# Patient Record
Sex: FEMALE | Race: WHITE | Employment: UNEMPLOYED | URBAN - NONMETROPOLITAN AREA
[De-identification: names, ages, dates, MRNs, and addresses within clinical notes are randomized per-mention and may not be internally consistent; named-entity substitution may affect disease eponyms.]

---

## 2019-06-15 ENCOUNTER — HOSPITAL ENCOUNTER (EMERGENCY)
Age: 54
Discharge: HOME | End: 2019-06-15
Payer: OTHER GOVERNMENT

## 2019-06-15 VITALS
OXYGEN SATURATION: 96 % | HEART RATE: 90 BPM | DIASTOLIC BLOOD PRESSURE: 76 MMHG | RESPIRATION RATE: 16 BRPM | TEMPERATURE: 97.9 F | BODY MASS INDEX: 21.46 KG/M2 | SYSTOLIC BLOOD PRESSURE: 159 MMHG | WEIGHT: 137 LBS

## 2019-06-15 DIAGNOSIS — N30.00 ACUTE CYSTITIS WITHOUT HEMATURIA: Primary | ICD-10-CM

## 2019-06-15 LAB
BILIRUBIN URINE: NEGATIVE
BLOOD, URINE: NEGATIVE
CHARACTER, URINE: CLEAR
COLOR: YELLOW
GLUCOSE, URINE: NEGATIVE MG/DL
KETONES, URINE: ABNORMAL
LEUKOCYTES, UA: ABNORMAL
NITRATE, UA: NEGATIVE
PH UA: 5.5 (ref 5–9)
PROTEIN UA: 30 MG/DL
REFLEX TO URINE C & S: ABNORMAL
SPECIFIC GRAVITY UA: 1.02 (ref 1–1.03)
UROBILINOGEN, URINE: 0.2 EU/DL (ref 0–1)

## 2019-06-15 PROCEDURE — 99202 OFFICE O/P NEW SF 15 MIN: CPT | Performed by: NURSE PRACTITIONER

## 2019-06-15 PROCEDURE — 87086 URINE CULTURE/COLONY COUNT: CPT

## 2019-06-15 PROCEDURE — 99213 OFFICE O/P EST LOW 20 MIN: CPT

## 2019-06-15 PROCEDURE — 81003 URINALYSIS AUTO W/O SCOPE: CPT

## 2019-06-15 RX ORDER — SULFAMETHOXAZOLE AND TRIMETHOPRIM 800; 160 MG/1; MG/1
1 TABLET ORAL 2 TIMES DAILY
Qty: 14 TABLET | Refills: 0 | Status: SHIPPED | OUTPATIENT
Start: 2019-06-15 | End: 2019-06-22

## 2019-06-15 RX ORDER — METOPROLOL SUCCINATE 25 MG/1
25 TABLET, EXTENDED RELEASE ORAL DAILY
COMMUNITY
End: 2020-11-16 | Stop reason: HOSPADM

## 2019-06-15 ASSESSMENT — ENCOUNTER SYMPTOMS
NAUSEA: 0
VOMITING: 0
BACK PAIN: 0
ABDOMINAL PAIN: 0

## 2019-06-15 ASSESSMENT — PAIN DESCRIPTION - LOCATION: LOCATION: VAGINA

## 2019-06-15 ASSESSMENT — PAIN DESCRIPTION - PAIN TYPE: TYPE: ACUTE PAIN

## 2019-06-15 ASSESSMENT — PAIN DESCRIPTION - DESCRIPTORS: DESCRIPTORS: BURNING

## 2019-06-15 ASSESSMENT — PAIN DESCRIPTION - FREQUENCY: FREQUENCY: CONTINUOUS

## 2019-06-15 ASSESSMENT — PAIN SCALES - GENERAL: PAINLEVEL_OUTOF10: 10

## 2019-06-15 NOTE — ED PROVIDER NOTES
volume, flank pain, hematuria, vaginal bleeding and vaginal discharge. Musculoskeletal: Negative for back pain. PAST MEDICAL HISTORY         Diagnosis Date    Migraines     RA (rheumatoid arthritis) ([address]     Rheumatoid arthritis(714.0)        SURGICALHISTORY     Patient  has a past surgical history that includes  section; Tubal ligation; Endometrial ablation; Endoscopy, colon, diagnostic (); Hysterectomy; and transesophageal echocardiogram.    CURRENT MEDICATIONS       Previous Medications    BUTALBITAL-ACETAMINOPHEN-CAFFEINE (FIORICET, ESGIC) PER TABLET    Take 1 tablet by mouth every 4 hours as needed. METHOTREXATE, ANTI-RHEUMATIC, PO    Take by mouth    METOPROLOL SUCCINATE (TOPROL XL) 25 MG EXTENDED RELEASE TABLET    Take 25 mg by mouth daily       ALLERGIES     Patient is has No Known Allergies. Patients   There is no immunization history on file for this patient. FAMILY HISTORY     Patient's family history includes Cancer in her father and mother; Stroke in her mother. SOCIAL HISTORY     Patient  reports that she has been smoking cigarettes. She has never used smokeless tobacco. She reports that she drinks alcohol. She reports that she does not use drugs. PHYSICAL EXAM     ED TRIAGE VITALS  BP: (!) 159/76, Temp: 97.9 °F (36.6 °C), Pulse: 90, Resp: 16, SpO2: 96 %,Estimated body mass index is 21.46 kg/m² as calculated from the following:    Height as of 17: 5' 7\" (1.702 m). Weight as of this encounter: 137 lb (62.1 kg). ,No LMP recorded. Patient has had a hysterectomy. Physical Exam   Constitutional: She is oriented to person, place, and time. Vital signs are normal. She appears well-developed and well-nourished. She is cooperative. Non-toxic appearance. She does not have a sickly appearance. She does not appear ill. No distress. HENT:   Head: Normocephalic.    Right Ear: External ear normal.   Left Ear: External ear normal.   Nose: Nose normal.   Neck: Normal range of motion. Cardiovascular: Normal rate. Pulmonary/Chest: Effort normal.   Abdominal: Soft. Bowel sounds are normal. There is tenderness in the suprapubic area. There is no rigidity, no CVA tenderness, no tenderness at McBurney's point and negative Ingram's sign. Neurological: She is alert and oriented to person, place, and time. Skin: Skin is warm and dry. Psychiatric: She has a normal mood and affect. Her behavior is normal.   Nursing note and vitals reviewed. DIAGNOSTIC RESULTS     Labs:  Results for orders placed or performed during the hospital encounter of 06/15/19   UA without Microscopic Reflex C&S   Result Value Ref Range    Glucose, Urine Negative NEGATIVE mg/dl    Bilirubin Urine Negative NEGATIVE    Ketones, Urine Trace (A) NEGATIVE    Specific Gravity, UA 1.025 1.002 - 1.03    Blood, Urine Negative NEGATIVE    pH, UA 5.50 5.0 - 9.0    Protein, UA 30 (A) NEGATIVE mg/dl    Urobilinogen, Urine 0.20 0.0 - 1.0 eu/dl    Nitrate, UA Negative NEGATIVE    LEUKOCYTES, UA Moderate (A) NEGATIVE    Color, UA Yellow STRAW-YELL    Character, Urine Clear CLEAR-SL C    REFLEX TO URINE C & S INDICATED        IMAGING:    No orders to display         EKG:      URGENT CARE COURSE:     Vitals:    06/15/19 1524   BP: (!) 159/76   Pulse: 90   Resp: 16   Temp: 97.9 °F (36.6 °C)   TempSrc: Oral   SpO2: 96%   Weight: 137 lb (62.1 kg)       Medications - No data to display         PROCEDURES:  None    FINAL IMPRESSION      1. Acute cystitis without hematuria          DISPOSITION/ PLAN        Patient or Patient designated representative was advised to drink plenty of water or fluids and take medication as prescribed. The patient or Patient designated representative is advised to monitor for any changes in pain, development of high fever, chills, persistent vomiting, development of increasing back or flank pain or increase in hematuria the patient is advised to go to the emergency department for reevaluation

## 2019-06-16 LAB
ORGANISM: ABNORMAL
URINE CULTURE REFLEX: ABNORMAL

## 2019-08-20 ENCOUNTER — HOSPITAL ENCOUNTER (EMERGENCY)
Age: 54
Discharge: HOME | End: 2019-08-20
Attending: EMERGENCY MEDICINE
Payer: OTHER GOVERNMENT

## 2019-08-20 ENCOUNTER — APPOINTMENT (OUTPATIENT)
Dept: GENERAL RADIOLOGY | Age: 54
End: 2019-08-20
Payer: OTHER GOVERNMENT

## 2019-08-20 VITALS
RESPIRATION RATE: 15 BRPM | SYSTOLIC BLOOD PRESSURE: 128 MMHG | HEART RATE: 67 BPM | OXYGEN SATURATION: 100 % | BODY MASS INDEX: 21.97 KG/M2 | WEIGHT: 140 LBS | DIASTOLIC BLOOD PRESSURE: 90 MMHG | HEIGHT: 67 IN | TEMPERATURE: 97.7 F

## 2019-08-20 DIAGNOSIS — R09.1 PLEURISY: ICD-10-CM

## 2019-08-20 DIAGNOSIS — R07.89 ATYPICAL CHEST PAIN: Primary | ICD-10-CM

## 2019-08-20 LAB
ALBUMIN SERPL-MCNC: 4.3 G/DL (ref 3.5–5.1)
ALP BLD-CCNC: 76 U/L (ref 38–126)
ALT SERPL-CCNC: 11 U/L (ref 11–66)
ANION GAP SERPL CALCULATED.3IONS-SCNC: 11 MEQ/L (ref 8–16)
AST SERPL-CCNC: 16 U/L (ref 5–40)
BACTERIA: ABNORMAL /HPF
BASOPHILS # BLD: 0.8 %
BASOPHILS ABSOLUTE: 0.1 THOU/MM3 (ref 0–0.1)
BILIRUB SERPL-MCNC: 0.4 MG/DL (ref 0.3–1.2)
BILIRUBIN URINE: NEGATIVE
BLOOD, URINE: NEGATIVE
BUN BLDV-MCNC: 17 MG/DL (ref 7–22)
CALCIUM SERPL-MCNC: 9.5 MG/DL (ref 8.5–10.5)
CASTS 2: ABNORMAL /LPF
CASTS UA: ABNORMAL /LPF
CHARACTER, URINE: ABNORMAL
CHLORIDE BLD-SCNC: 100 MEQ/L (ref 98–111)
CO2: 28 MEQ/L (ref 23–33)
COLOR: YELLOW
CREAT SERPL-MCNC: 0.8 MG/DL (ref 0.4–1.2)
CRYSTALS, UA: ABNORMAL
D-DIMER QUANTITATIVE: 294 NG/ML FEU (ref 0–500)
EKG ATRIAL RATE: 65 BPM
EKG P AXIS: 54 DEGREES
EKG P-R INTERVAL: 166 MS
EKG Q-T INTERVAL: 404 MS
EKG QRS DURATION: 100 MS
EKG QTC CALCULATION (BAZETT): 420 MS
EKG R AXIS: 65 DEGREES
EKG T AXIS: 70 DEGREES
EKG VENTRICULAR RATE: 65 BPM
EOSINOPHIL # BLD: 0.4 %
EOSINOPHILS ABSOLUTE: 0 THOU/MM3 (ref 0–0.4)
EPITHELIAL CELLS, UA: ABNORMAL /HPF
ERYTHROCYTE [DISTWIDTH] IN BLOOD BY AUTOMATED COUNT: 11.9 % (ref 11.5–14.5)
ERYTHROCYTE [DISTWIDTH] IN BLOOD BY AUTOMATED COUNT: 42 FL (ref 35–45)
GFR SERPL CREATININE-BSD FRML MDRD: 75 ML/MIN/1.73M2
GLUCOSE BLD-MCNC: 92 MG/DL (ref 70–108)
GLUCOSE URINE: NEGATIVE MG/DL
HCT VFR BLD CALC: 39.3 % (ref 37–47)
HEMOGLOBIN: 12.7 GM/DL (ref 12–16)
IMMATURE GRANS (ABS): 0.02 THOU/MM3 (ref 0–0.07)
IMMATURE GRANULOCYTES: 0 %
KETONES, URINE: NEGATIVE
LEUKOCYTE ESTERASE, URINE: ABNORMAL
LIPASE: 24.8 U/L (ref 5.6–51.3)
LYMPHOCYTES # BLD: 18.8 %
LYMPHOCYTES ABSOLUTE: 1.4 THOU/MM3 (ref 1–4.8)
MCH RBC QN AUTO: 31.4 PG (ref 26–33)
MCHC RBC AUTO-ENTMCNC: 32.3 GM/DL (ref 32.2–35.5)
MCV RBC AUTO: 97.3 FL (ref 81–99)
MISCELLANEOUS 2: ABNORMAL
MONOCYTES # BLD: 9.9 %
MONOCYTES ABSOLUTE: 0.8 THOU/MM3 (ref 0.4–1.3)
MUCUS: ABNORMAL
NITRITE, URINE: NEGATIVE
NUCLEATED RED BLOOD CELLS: 0 /100 WBC
OSMOLALITY CALCULATION: 278.7 MOSMOL/KG (ref 275–300)
PH UA: 5.5 (ref 5–9)
PLATELET # BLD: 350 THOU/MM3 (ref 130–400)
PMV BLD AUTO: 9.8 FL (ref 9.4–12.4)
POTASSIUM SERPL-SCNC: 4.5 MEQ/L (ref 3.5–5.2)
PROTEIN UA: NEGATIVE
RBC # BLD: 4.04 MILL/MM3 (ref 4.2–5.4)
RBC URINE: ABNORMAL /HPF
RENAL EPITHELIAL, UA: ABNORMAL
SEG NEUTROPHILS: 69.8 %
SEGMENTED NEUTROPHILS ABSOLUTE COUNT: 5.3 THOU/MM3 (ref 1.8–7.7)
SODIUM BLD-SCNC: 139 MEQ/L (ref 135–145)
SPECIFIC GRAVITY, URINE: 1.03 (ref 1–1.03)
TOTAL PROTEIN: 7.6 G/DL (ref 6.1–8)
TROPONIN T: < 0.01 NG/ML
UROBILINOGEN, URINE: 0.2 EU/DL (ref 0–1)
WBC # BLD: 7.6 THOU/MM3 (ref 4.8–10.8)
WBC UA: ABNORMAL /HPF
YEAST: ABNORMAL

## 2019-08-20 PROCEDURE — 36415 COLL VENOUS BLD VENIPUNCTURE: CPT

## 2019-08-20 PROCEDURE — 85025 COMPLETE CBC W/AUTO DIFF WBC: CPT

## 2019-08-20 PROCEDURE — 99285 EMERGENCY DEPT VISIT HI MDM: CPT

## 2019-08-20 PROCEDURE — 93010 ELECTROCARDIOGRAM REPORT: CPT | Performed by: INTERNAL MEDICINE

## 2019-08-20 PROCEDURE — 80053 COMPREHEN METABOLIC PANEL: CPT

## 2019-08-20 PROCEDURE — 6360000002 HC RX W HCPCS: Performed by: EMERGENCY MEDICINE

## 2019-08-20 PROCEDURE — 81001 URINALYSIS AUTO W/SCOPE: CPT

## 2019-08-20 PROCEDURE — 85379 FIBRIN DEGRADATION QUANT: CPT

## 2019-08-20 PROCEDURE — 71045 X-RAY EXAM CHEST 1 VIEW: CPT

## 2019-08-20 PROCEDURE — 96374 THER/PROPH/DIAG INJ IV PUSH: CPT

## 2019-08-20 PROCEDURE — 93005 ELECTROCARDIOGRAM TRACING: CPT | Performed by: EMERGENCY MEDICINE

## 2019-08-20 PROCEDURE — 83690 ASSAY OF LIPASE: CPT

## 2019-08-20 PROCEDURE — 87086 URINE CULTURE/COLONY COUNT: CPT

## 2019-08-20 PROCEDURE — 84484 ASSAY OF TROPONIN QUANT: CPT

## 2019-08-20 RX ORDER — NAPROXEN 500 MG/1
500 TABLET ORAL 2 TIMES DAILY PRN
Qty: 30 TABLET | Refills: 0 | Status: SHIPPED | OUTPATIENT
Start: 2019-08-20 | End: 2019-10-04 | Stop reason: ALTCHOICE

## 2019-08-20 RX ORDER — HYDROCODONE BITARTRATE AND ACETAMINOPHEN 5; 325 MG/1; MG/1
.5-1 TABLET ORAL EVERY 4 HOURS PRN
Qty: 6 TABLET | Refills: 0 | Status: SHIPPED | OUTPATIENT
Start: 2019-08-20 | End: 2019-08-22

## 2019-08-20 RX ORDER — KETOROLAC TROMETHAMINE 30 MG/ML
15 INJECTION, SOLUTION INTRAMUSCULAR; INTRAVENOUS ONCE
Status: COMPLETED | OUTPATIENT
Start: 2019-08-20 | End: 2019-08-20

## 2019-08-20 RX ADMIN — KETOROLAC TROMETHAMINE 15 MG: 30 INJECTION, SOLUTION INTRAMUSCULAR at 16:57

## 2019-08-20 ASSESSMENT — ENCOUNTER SYMPTOMS
SORE THROAT: 0
NAUSEA: 0
VOMITING: 0
BACK PAIN: 0
DIARRHEA: 0
SHORTNESS OF BREATH: 0
WHEEZING: 0
EYE DISCHARGE: 0
COUGH: 0
EYE PAIN: 0
CONSTIPATION: 0
ABDOMINAL PAIN: 0
RHINORRHEA: 0

## 2019-08-20 ASSESSMENT — PAIN DESCRIPTION - LOCATION
LOCATION: ABDOMEN

## 2019-08-20 ASSESSMENT — PAIN DESCRIPTION - ORIENTATION
ORIENTATION: LEFT;UPPER

## 2019-08-20 ASSESSMENT — HEART SCORE: ECG: 0

## 2019-08-20 ASSESSMENT — PAIN SCALES - GENERAL
PAINLEVEL_OUTOF10: 2
PAINLEVEL_OUTOF10: 7
PAINLEVEL_OUTOF10: 8

## 2019-08-20 ASSESSMENT — PAIN DESCRIPTION - DESCRIPTORS: DESCRIPTORS: ACHING

## 2019-08-20 ASSESSMENT — PAIN DESCRIPTION - PAIN TYPE
TYPE: ACUTE PAIN
TYPE: ACUTE PAIN

## 2019-08-20 NOTE — ED PROVIDER NOTES
[facility] COMPLAINT       Chief Complaint   Patient presents with    Abdominal Pain     luq       Nurses Notes reviewed and I agree except as noted in the HPI. HISTORY OF PRESENT ILLNESS    Vince Villarreal is a 49 y.o. female who presents to the Emergency Department from home for the evaluation of left sided chest wall pain onset last night. She rates her current pain as a 7/10 in severity and states it is made worse with twisting to the left, moving twisting lifting and palpation on the left side of her chest. Patient states she also has pain with deep inspiration. She describes her pain as a constant sharp pain in character. Patient denies pain with exertion. Patient denies cough, dysuria, hematuria or dark urine. Patient states she has RA but denies ever having Costochondritis. Patient has had an ablation and hysterectomy done on her abdomen. Is not on hormonal replacement therapy. No history of DVT hemoptysis or blood clots. no further complaints at the time of the initial encounter. The HPI was provided by the patient. REVIEW OF SYSTEMS     Review of Systems   Constitutional: Negative for appetite change, chills, fatigue and fever. HENT: Negative for congestion, ear pain, rhinorrhea and sore throat. Eyes: Negative for pain, discharge and visual disturbance. Respiratory: Negative for cough, shortness of breath and wheezing. Cardiovascular: Positive for chest pain (chest wall, left). Negative for palpitations and leg swelling. Gastrointestinal: Negative for abdominal pain, constipation, diarrhea, nausea and vomiting. Genitourinary: Negative for decreased urine volume, difficulty urinating, dysuria, flank pain, frequency, hematuria, urgency and vaginal discharge. Musculoskeletal: Negative for arthralgias, back pain, joint swelling and neck pain. Skin: Negative for pallor and rash.    Neurological: Negative for dizziness, syncope, weakness, light-headedness, numbness and headaches. Hematological: Negative for adenopathy. Psychiatric/Behavioral: Negative for confusion and suicidal ideas. The patient is not nervous/anxious. PAST MEDICAL HISTORY    has a past medical history of Migraines, RA (rheumatoid arthritis) ([address] and Rheumatoid arthritis(714.0). SURGICAL HISTORY      has a past surgical history that includes  section; Tubal ligation; Endometrial ablation; Endoscopy, colon, diagnostic (); Hysterectomy; and transesophageal echocardiogram.    CURRENT MEDICATIONS       Previous Medications    BUTALBITAL-ACETAMINOPHEN-CAFFEINE (FIORICET, ESGIC) PER TABLET    Take 1 tablet by mouth every 4 hours as needed. METHOTREXATE, ANTI-RHEUMATIC, PO    Take by mouth    METOPROLOL SUCCINATE (TOPROL XL) 25 MG EXTENDED RELEASE TABLET    Take 25 mg by mouth daily       ALLERGIES     has No Known Allergies. FAMILY HISTORY     She indicated that her mother is alive. She indicated that her father is . family history includes Cancer in her father and mother; Stroke in her mother. SOCIAL HISTORY      reports that she has been smoking cigarettes. She has never used smokeless tobacco. She reports that she drinks alcohol. She reports that she does not use drugs. PHYSICAL EXAM     INITIAL VITALS:  height is 5' 7\" (1.702 m) and weight is 140 lb (63.5 kg). Her oral temperature is 97.7 °F (36.5 °C). Her blood pressure is 150/74 (abnormal) and her pulse is 66. Her respiration is 16 and oxygen saturation is 98%. Physical Exam   Constitutional: She is oriented to person, place, and time. She appears well-developed and well-nourished. No distress. HENT:   Head: Normocephalic and atraumatic. Eyes: Conjunctivae and EOM are normal.   Neck: Normal range of motion. Neck supple. No thyromegaly present. Cardiovascular: Normal rate, regular rhythm and normal heart sounds. No murmur heard.   Pulmonary/Chest: Effort

## 2019-08-20 NOTE — ED TRIAGE NOTES
Patient presents to room 29 with complaints of abdominal pain. Patient stated pain fluctuates from 4-8 on a 0/10.

## 2019-08-20 NOTE — ED NOTES
Pt medicated per order. VS stbale. Call light within easy reach. Will continue to monitor.      Kal Ayers RN  08/20/19 6423

## 2019-08-21 LAB
ORGANISM: ABNORMAL
URINE CULTURE REFLEX: ABNORMAL

## 2019-10-04 ENCOUNTER — HOSPITAL ENCOUNTER (EMERGENCY)
Age: 54
Discharge: HOME | End: 2019-10-04
Payer: OTHER GOVERNMENT

## 2019-10-04 VITALS
OXYGEN SATURATION: 97 % | WEIGHT: 140.13 LBS | TEMPERATURE: 97 F | HEART RATE: 90 BPM | RESPIRATION RATE: 18 BRPM | HEIGHT: 67 IN | DIASTOLIC BLOOD PRESSURE: 73 MMHG | BODY MASS INDEX: 21.99 KG/M2 | SYSTOLIC BLOOD PRESSURE: 133 MMHG

## 2019-10-04 DIAGNOSIS — N30.01 ACUTE CYSTITIS WITH HEMATURIA: Primary | ICD-10-CM

## 2019-10-04 LAB
BILIRUBIN URINE: NEGATIVE
BLOOD, URINE: ABNORMAL
CHARACTER, URINE: CLEAR
COLOR: ABNORMAL
GLUCOSE BLD-MCNC: 107 MG/DL (ref 70–108)
GLUCOSE, URINE: 100 MG/DL
KETONES, URINE: NEGATIVE
LEUKOCYTES, UA: ABNORMAL
NITRATE, UA: POSITIVE
PH UA: 5 (ref 5–9)
PROTEIN UA: NEGATIVE MG/DL
REFLEX TO URINE C & S: ABNORMAL
SPECIFIC GRAVITY UA: 1.01 (ref 1–1.03)
UROBILINOGEN, URINE: 0.2 EU/DL (ref 0–1)

## 2019-10-04 PROCEDURE — 99213 OFFICE O/P EST LOW 20 MIN: CPT | Performed by: NURSE PRACTITIONER

## 2019-10-04 PROCEDURE — 99213 OFFICE O/P EST LOW 20 MIN: CPT

## 2019-10-04 PROCEDURE — 87186 SC STD MICRODIL/AGAR DIL: CPT

## 2019-10-04 PROCEDURE — 82948 REAGENT STRIP/BLOOD GLUCOSE: CPT

## 2019-10-04 PROCEDURE — 87086 URINE CULTURE/COLONY COUNT: CPT

## 2019-10-04 PROCEDURE — 87077 CULTURE AEROBIC IDENTIFY: CPT

## 2019-10-04 PROCEDURE — 81003 URINALYSIS AUTO W/O SCOPE: CPT

## 2019-10-04 RX ORDER — SULFAMETHOXAZOLE AND TRIMETHOPRIM 800; 160 MG/1; MG/1
1 TABLET ORAL 2 TIMES DAILY
Qty: 10 TABLET | Refills: 0 | Status: SHIPPED | OUTPATIENT
Start: 2019-10-04 | End: 2019-10-09

## 2019-10-04 ASSESSMENT — ENCOUNTER SYMPTOMS
BACK PAIN: 0
SHORTNESS OF BREATH: 0
VOMITING: 0
ABDOMINAL PAIN: 0
NAUSEA: 0

## 2019-10-06 LAB
ORGANISM: ABNORMAL
URINE CULTURE REFLEX: ABNORMAL

## 2020-01-11 ENCOUNTER — HOSPITAL ENCOUNTER (EMERGENCY)
Age: 55
Discharge: HOME | End: 2020-01-11
Payer: OTHER GOVERNMENT

## 2020-01-11 VITALS
TEMPERATURE: 97.8 F | OXYGEN SATURATION: 98 % | HEART RATE: 84 BPM | RESPIRATION RATE: 20 BRPM | BODY MASS INDEX: 21.97 KG/M2 | HEIGHT: 67 IN | DIASTOLIC BLOOD PRESSURE: 80 MMHG | SYSTOLIC BLOOD PRESSURE: 150 MMHG | WEIGHT: 140 LBS

## 2020-01-11 PROCEDURE — 99213 OFFICE O/P EST LOW 20 MIN: CPT | Performed by: NURSE PRACTITIONER

## 2020-01-11 PROCEDURE — 99212 OFFICE O/P EST SF 10 MIN: CPT

## 2020-01-11 RX ORDER — ACYCLOVIR 800 MG/1
800 TABLET ORAL
Qty: 35 TABLET | Refills: 0 | Status: SHIPPED | OUTPATIENT
Start: 2020-01-11 | End: 2020-01-18

## 2020-01-11 RX ORDER — HYDROCODONE BITARTRATE AND ACETAMINOPHEN 5; 325 MG/1; MG/1
1 TABLET ORAL EVERY 6 HOURS PRN
Qty: 12 TABLET | Refills: 0 | Status: SHIPPED | OUTPATIENT
Start: 2020-01-11 | End: 2020-01-14

## 2020-01-11 ASSESSMENT — PAIN DESCRIPTION - PAIN TYPE: TYPE: ACUTE PAIN

## 2020-01-11 ASSESSMENT — PAIN DESCRIPTION - PROGRESSION: CLINICAL_PROGRESSION: GRADUALLY WORSENING

## 2020-01-11 ASSESSMENT — PAIN DESCRIPTION - ORIENTATION: ORIENTATION: LEFT;RIGHT;MID

## 2020-01-11 ASSESSMENT — PAIN DESCRIPTION - DESCRIPTORS: DESCRIPTORS: SHARP

## 2020-01-11 ASSESSMENT — PAIN DESCRIPTION - FREQUENCY: FREQUENCY: CONTINUOUS

## 2020-01-11 ASSESSMENT — PAIN SCALES - GENERAL: PAINLEVEL_OUTOF10: 2

## 2020-01-11 ASSESSMENT — PAIN - FUNCTIONAL ASSESSMENT: PAIN_FUNCTIONAL_ASSESSMENT: PREVENTS OR INTERFERES SOME ACTIVE ACTIVITIES AND ADLS

## 2020-01-11 ASSESSMENT — PAIN DESCRIPTION - LOCATION: LOCATION: BACK

## 2020-01-11 NOTE — ED PROVIDER NOTES
[facility]  Ur[facility] Care Encounter       CHIEF COMPLAINT       Chief Complaint   Patient presents with    Rash     (R) Mid Back       Nurses Notes reviewed and I agree except as noted in the HPI. HISTORY OF PRESENT ILLNESS   Naga Villarreal is a 49 y.o. female who presents to the urgent care center complaining of a rash to the right mid back that started 3 days ago. Patient rates her pain initially 2 on a 10 scale describes it as sharp and continuous. Patient denies any fever or chills. Patient states that it is starting to radiate to the right side of her torso. The history is provided by the patient. No  was used. Rash   Location:  Torso  Quality: blistering, painful and redness    Quality: not burning, not draining and not dry    Pain details:     Quality:  Stinging    Severity:  Mild    Onset quality:  Sudden    Duration:  3 days    Timing:  Constant    Progression:  Unchanged  Severity:  Mild  Onset quality:  Sudden  Duration:  3 days  Timing:  Constant  Progression:  Waxing and waning  Chronicity:  New  Context: not chemical exposure    Relieved by:  Nothing  Worsened by:  Nothing  Ineffective treatments:  None tried  Associated symptoms: no fever, no headaches and no myalgias        REVIEW OF SYSTEMS     Review of Systems   Constitutional: Negative for appetite change, chills and fever. Musculoskeletal: Negative for myalgias. Skin: Positive for rash. Right mid back and torso   Neurological: Negative for headaches. PAST MEDICAL HISTORY         Diagnosis Date    Migraines     [facility] ([facility]heumatoid [facility]) [address] [address]     Rheumatoid arthritis(714.0)        SURGICALHISTORY     Patient  has a past surgical history that includes  section; Tubal ligation; Endometrial ablation; Endoscopy, colon, diagnostic ();  Hysterectomy; and transesophageal echocardiogram.    CURRENT MEDICATIONS       Discharge Medication List as of 2020  3:25 PM for up to 3 days. , Disp-12 tablet, R-0Normal             Discharge Medication List as of 1/11/2020  3:25 PM          Discharge Medication List as of 1/11/2020  3:25 PM          ALY Loera CNP    (Please note that portions of this note were completed with a voice recognition program. Efforts were made to edit the dictations but occasionally words are mis-transcribed.)           ALY Loera CNP  01/11/20 4408

## 2020-05-22 ENCOUNTER — HOSPITAL ENCOUNTER (OUTPATIENT)
Dept: WOMENS IMAGING | Age: 55
Discharge: HOME | End: 2020-05-22
Payer: OTHER GOVERNMENT

## 2020-05-22 PROCEDURE — 77063 BREAST TOMOSYNTHESIS BI: CPT

## 2020-05-26 ENCOUNTER — HOSPITAL ENCOUNTER (OUTPATIENT)
Dept: WOMENS IMAGING | Age: 55
Discharge: HOME | End: 2020-05-26
Payer: OTHER GOVERNMENT

## 2020-05-26 PROCEDURE — 3209999900 MAM COMPARISON OF OUTSIDE IMAGES

## 2020-11-16 ENCOUNTER — HOSPITAL ENCOUNTER (EMERGENCY)
Age: 55
Discharge: HOME | End: 2020-11-16
Attending: EMERGENCY MEDICINE
Payer: OTHER GOVERNMENT

## 2020-11-16 VITALS
DIASTOLIC BLOOD PRESSURE: 77 MMHG | SYSTOLIC BLOOD PRESSURE: 125 MMHG | TEMPERATURE: 98 F | RESPIRATION RATE: 16 BRPM | OXYGEN SATURATION: 99 % | HEART RATE: 77 BPM

## 2020-11-16 LAB
ANION GAP SERPL CALCULATED.3IONS-SCNC: 11 MEQ/L (ref 8–16)
BASOPHILS # BLD: 0.9 %
BASOPHILS ABSOLUTE: 0 THOU/MM3 (ref 0–0.1)
BUN BLDV-MCNC: 7 MG/DL (ref 7–22)
CALCIUM SERPL-MCNC: 9.9 MG/DL (ref 8.5–10.5)
CHLORIDE BLD-SCNC: 105 MEQ/L (ref 98–111)
CO2: 25 MEQ/L (ref 23–33)
CREAT SERPL-MCNC: 0.7 MG/DL (ref 0.4–1.2)
EKG ATRIAL RATE: 72 BPM
EKG P AXIS: 63 DEGREES
EKG P-R INTERVAL: 156 MS
EKG Q-T INTERVAL: 374 MS
EKG QRS DURATION: 90 MS
EKG QTC CALCULATION (BAZETT): 409 MS
EKG R AXIS: 71 DEGREES
EKG T AXIS: 68 DEGREES
EKG VENTRICULAR RATE: 72 BPM
EOSINOPHIL # BLD: 0.2 %
EOSINOPHILS ABSOLUTE: 0 THOU/MM3 (ref 0–0.4)
ERYTHROCYTE [DISTWIDTH] IN BLOOD BY AUTOMATED COUNT: 11.9 % (ref 11.5–14.5)
ERYTHROCYTE [DISTWIDTH] IN BLOOD BY AUTOMATED COUNT: 42.9 FL (ref 35–45)
GFR SERPL CREATININE-BSD FRML MDRD: 87 ML/MIN/1.73M2
GLUCOSE BLD-MCNC: 89 MG/DL (ref 70–108)
HCT VFR BLD CALC: 42 % (ref 37–47)
HEMOGLOBIN: 13.9 GM/DL (ref 12–16)
IMMATURE GRANS (ABS): 0.01 THOU/MM3 (ref 0–0.07)
IMMATURE GRANULOCYTES: 0.2 %
LYMPHOCYTES # BLD: 35.1 %
LYMPHOCYTES ABSOLUTE: 1.6 THOU/MM3 (ref 1–4.8)
MCH RBC QN AUTO: 32.3 PG (ref 26–33)
MCHC RBC AUTO-ENTMCNC: 33.1 GM/DL (ref 32.2–35.5)
MCV RBC AUTO: 97.4 FL (ref 81–99)
MONOCYTES # BLD: 9.7 %
MONOCYTES ABSOLUTE: 0.4 THOU/MM3 (ref 0.4–1.3)
NUCLEATED RED BLOOD CELLS: 0 /100 WBC
OSMOLALITY CALCULATION: 278.7 MOSMOL/KG (ref 275–300)
PLATELET # BLD: 316 THOU/MM3 (ref 130–400)
PMV BLD AUTO: 9.9 FL (ref 9.4–12.4)
POTASSIUM SERPL-SCNC: 4 MEQ/L (ref 3.5–5.2)
RBC # BLD: 4.31 MILL/MM3 (ref 4.2–5.4)
SEG NEUTROPHILS: 53.9 %
SEGMENTED NEUTROPHILS ABSOLUTE COUNT: 2.5 THOU/MM3 (ref 1.8–7.7)
SODIUM BLD-SCNC: 141 MEQ/L (ref 135–145)
T4 FREE: 0.88 NG/DL (ref 0.93–1.76)
TROPONIN T: < 0.01 NG/ML
TSH SERPL DL<=0.05 MIU/L-ACNC: 1.05 UIU/ML (ref 0.4–4.2)
WBC # BLD: 4.6 THOU/MM3 (ref 4.8–10.8)

## 2020-11-16 PROCEDURE — 80048 BASIC METABOLIC PNL TOTAL CA: CPT

## 2020-11-16 PROCEDURE — 84484 ASSAY OF TROPONIN QUANT: CPT

## 2020-11-16 PROCEDURE — 36415 COLL VENOUS BLD VENIPUNCTURE: CPT

## 2020-11-16 PROCEDURE — 85025 COMPLETE CBC W/AUTO DIFF WBC: CPT

## 2020-11-16 PROCEDURE — 84443 ASSAY THYROID STIM HORMONE: CPT

## 2020-11-16 PROCEDURE — 93005 ELECTROCARDIOGRAM TRACING: CPT | Performed by: EMERGENCY MEDICINE

## 2020-11-16 PROCEDURE — 84439 ASSAY OF FREE THYROXINE: CPT

## 2020-11-16 PROCEDURE — 99283 EMERGENCY DEPT VISIT LOW MDM: CPT

## 2020-11-16 ASSESSMENT — ENCOUNTER SYMPTOMS
VOMITING: 0
WHEEZING: 0
CONSTIPATION: 0
DIARRHEA: 0
BACK PAIN: 0
RHINORRHEA: 0
CHEST TIGHTNESS: 0
SORE THROAT: 0
SINUS PRESSURE: 0
TROUBLE SWALLOWING: 0
COUGH: 0
ABDOMINAL PAIN: 0
SHORTNESS OF BREATH: 0
NAUSEA: 0
VOICE CHANGE: 0

## 2020-11-16 NOTE — ED PROVIDER NOTES
[address] [facility] COMPLAINT    Chief Complaint   Patient presents with    Hypotension       Nurses Notes reviewed and I agree except as noted in the HPI. HPI    Pk Gonzalez is a 48 y.o. female who presents for evaluation of low blood pressure. The patient states that she had hypertension for the past several years and been on medication lisinopril and Toprol however the patient stopped the lisinopril after taking it only for 1 month  2 years ago. The patient states that she has been checking blood pressure and they were low,for which she has not been taking her metoprolol for the past 2 days and her pressure yesterday was 101/70, 95/68, blood pressure yesterday is 118/68. Patient's admits that she has been watching her weight she lost weight with the keto diet and lost 10 pounds 1 year ago. Denies any lightheadedness no dizziness no shortness of breath or chest pain no nausea or vomiting. REVIEW OF SYSTEMS    Review of Systems   Constitutional: Negative for appetite change, chills, diaphoresis, fatigue and fever. HENT: Negative for congestion, ear pain, postnasal drip, rhinorrhea, sinus pressure, sneezing, sore throat, trouble swallowing and voice change. Respiratory: Negative for cough, chest tightness, shortness of breath and wheezing. Cardiovascular: Negative for chest pain, palpitations and leg swelling. Gastrointestinal: Negative for abdominal pain, constipation, diarrhea, nausea and vomiting. Musculoskeletal: Negative for arthralgias, back pain, joint swelling, myalgias, neck pain and neck stiffness. Neurological: Negative for dizziness, syncope, weakness, light-headedness, numbness and headaches. PAST MEDICAL HISTORY     has a past medical history of Migraines, RA (rheumatoid arthritis) ([facility] [address] and Rheumatoid arthritis(714.0).     SURGICAL HISTORY   has a past surgical history that includes  section; Tubal ligation; Endometrial ablation; Endoscopy, colon, diagnostic (); Hysterectomy; and transesophageal echocardiogram.    CURRENT MEDICATIONS    Current Discharge Medication List      CONTINUE these medications which have NOT CHANGED    Details   METHOTREXATE, ANTI-RHEUMATIC, PO Take by mouth      butalbital-acetaminophen-caffeine (FIORICET, ESGIC) per tablet Take 1 tablet by mouth every 4 hours as needed. ALLERGIES    has No Known Allergies. FAMILY HISTORY    She indicated that her mother is alive. She indicated that her father is . family history includes Cancer in her father and mother; Stroke in her mother. SOCIAL HISTORY     reports that she has quit smoking. Her smoking use included cigarettes. She has never used smokeless tobacco. She reports current alcohol use. She reports that she does not use drugs. PHYSICAL EXAM      INITIAL VITALS: /77   Pulse 77   Temp 98 °F (36.7 °C) (Oral)   Resp 16   SpO2 99% Estimated body mass index is 21.93 kg/m² as calculated from the following:    Height as of 20: 5' 7\" (1.702 m). Weight as of 20: 140 lb (63.5 kg). Physical Exam  Vitals signs reviewed. Constitutional:       Appearance: She is well-developed. HENT:      Head: Normocephalic and atraumatic. Right Ear: External ear normal.      Left Ear: External ear normal.      Nose: Nose normal.   Eyes:      General: No scleral icterus. Conjunctiva/sclera: Conjunctivae normal.      Pupils: Pupils are equal, round, and reactive to light. Neck:      Musculoskeletal: Normal range of motion and neck supple. Thyroid: No thyromegaly. Vascular: No JVD. Cardiovascular:      Rate and Rhythm: Normal rate and regular rhythm. Heart sounds: No murmur. No friction rub. Pulmonary:      Effort: Pulmonary effort is normal.      Breath sounds: Normal breath sounds. No wheezing or rales. Chest:      Chest wall: No tenderness. Abdominal:      General: Bowel sounds are normal.      Palpations: Abdomen is soft. There is no mass. Tenderness: There is no abdominal tenderness. Lymphadenopathy:      Cervical: No cervical adenopathy. Skin:     Findings: No rash. Neurological:      Mental Status: She is alert and oriented to person, place, and time. Psychiatric:         Behavior: Behavior is cooperative. MEDICAL DECISION MAKING    DIFFERENTIAL DIAGNOSIS:  Low blood pressure and patient's taking antihypertensive medication      DIAGNOSTIC RESULTS    EKG   Interpreted by Roxy Chacko MD      Rhythm: normal sinus   Rate: normal  Axis: normal  Ectopy: none  Conduction: normal  ST Segments: no acute change  T Waves: no acute change  Q Waves: none    Clinical Impression: Normal sinus rhythm with no acute changes/normal EKG      Roxy Chacko MD      RADIOLOGY:    No orders to display       LABS:   Labs Reviewed   CBC WITH AUTO DIFFERENTIAL - Abnormal; Notable for the following components:       Result Value    WBC 4.6 (*)     All other components within normal limits   GLOMERULAR FILTRATION RATE, ESTIMATED - Abnormal; Notable for the following components:    Est, Glom Filt Rate 87 (*)     All other components within normal limits   T4, FREE - Abnormal; Notable for the following components:    T4 Free 0.88 (*)     All other components within normal limits   BASIC METABOLIC PANEL   TROPONIN   ANION GAP   OSMOLALITY   TSH WITH REFLEX     All other unresulted laboratory test above are normal:    Vitals:    Vitals:    11/16/20 1543 11/16/20 1715   BP: (!) 149/91 125/77   Pulse: 79 77   Resp: 16 16   Temp: 98 °F (36.7 °C)    TempSrc: Oral    SpO2: 100% 99%       EMERGENCY DEPARTMENT COURSE:    Medications - No data to display    The pt was seen and evaluated by me. Within the department, I observed the pt's vitalsigns to be within acceptable range. Laboratory studies were performed, results were reviewed with the patient. Ruddy Wade

## 2020-11-16 NOTE — ED TRIAGE NOTES
Pt presents to the ED for hypotension x5 days. Pt states her BP was 113/68  Earlier today. Pt states she has had blurry vision and confusion. Pt denies chest pain.

## 2020-11-17 NOTE — ED NOTES
This RN received report from [facility], [place]. Pt laying on cot with respirations easy and unlabored. Pt updated on POC. Dr. Dave Berger at bedside to updated pt.       Rivera Torres RN  11/16/20 1925

## 2021-04-19 ENCOUNTER — HOSPITAL ENCOUNTER (EMERGENCY)
Age: 56
Discharge: HOME | End: 2021-04-19
Payer: OTHER GOVERNMENT

## 2021-04-19 VITALS
BODY MASS INDEX: 21.97 KG/M2 | WEIGHT: 140 LBS | HEIGHT: 67 IN | HEART RATE: 70 BPM | OXYGEN SATURATION: 97 % | DIASTOLIC BLOOD PRESSURE: 74 MMHG | TEMPERATURE: 97.7 F | RESPIRATION RATE: 16 BRPM | SYSTOLIC BLOOD PRESSURE: 132 MMHG

## 2021-04-19 DIAGNOSIS — R68.81 EARLY SATIETY: ICD-10-CM

## 2021-04-19 DIAGNOSIS — R53.83 FATIGUE, UNSPECIFIED TYPE: Primary | ICD-10-CM

## 2021-04-19 DIAGNOSIS — J06.9 VIRAL URI: ICD-10-CM

## 2021-04-19 DIAGNOSIS — K59.01 SLOW TRANSIT CONSTIPATION: ICD-10-CM

## 2021-04-19 PROCEDURE — U0003 INFECTIOUS AGENT DETECTION BY NUCLEIC ACID (DNA OR RNA); SEVERE ACUTE RESPIRATORY SYNDROME CORONAVIRUS 2 (SARS-COV-2) (CORONAVIRUS DISEASE [COVID-19]), AMPLIFIED PROBE TECHNIQUE, MAKING USE OF HIGH THROUGHPUT TECHNOLOGIES AS DESCRIBED BY CMS-2020-01-R: HCPCS

## 2021-04-19 PROCEDURE — 99213 OFFICE O/P EST LOW 20 MIN: CPT | Performed by: NURSE PRACTITIONER

## 2021-04-19 PROCEDURE — U0005 INFEC AGEN DETEC AMPLI PROBE: HCPCS

## 2021-04-19 PROCEDURE — 99213 OFFICE O/P EST LOW 20 MIN: CPT

## 2021-04-19 ASSESSMENT — ENCOUNTER SYMPTOMS
EYE REDNESS: 0
SORE THROAT: 0
TROUBLE SWALLOWING: 0
ABDOMINAL DISTENTION: 0
SHORTNESS OF BREATH: 0
ABDOMINAL PAIN: 0
RHINORRHEA: 1
SINUS PAIN: 0
DIARRHEA: 0
CONSTIPATION: 0
EYE DISCHARGE: 0
SINUS PRESSURE: 0
COUGH: 0
VOMITING: 0
NAUSEA: 0

## 2021-04-19 NOTE — ED TRIAGE NOTES
Pt complains that since Thursday she has been fatigue with nasal congestion. Then states Yomi Portillo will be very hungry and eat then feel really full\" denies fever or having loss of taste or smell.

## 2021-04-19 NOTE — ED PROVIDER NOTES
Diagnosis Date    Migraines     RA (rheumatoid arthritis) ([facility] [address]     Rheumatoid arthritis(714.0)        SURGICAL HISTORY     Patient  has a past surgical history that includes  section; Tubal ligation; Endometrial ablation; Endoscopy, colon, diagnostic (); Hysterectomy; and transesophageal echocardiogram.    CURRENT MEDICATIONS       Discharge Medication List as of 2021  3:02 PM      CONTINUE these medications which have NOT CHANGED    Details   Metoprolol Succinate 50 MG CS24 Take by mouthHistorical Med      butalbital-acetaminophen-caffeine (FIORICET, ESGIC) per tablet Take 1 tablet by mouth every 4 hours as needed. ALLERGIES     Patient is has No Known Allergies. FAMILY HISTORY     Patient'sfamily history includes Cancer in her father and mother; Stroke in her mother. SOCIAL HISTORY     Patient  reports that she has quit smoking. Her smoking use included cigarettes. She has never used smokeless tobacco. She reports current alcohol use. She reports that she does not use drugs. PHYSICAL EXAM     ED TRIAGE VITALS  BP: 132/74, Temp: 97.7 °F (36.5 °C), Pulse: 70, Resp: 16, SpO2: 97 %  Physical Exam  Vitals signs and nursing note reviewed. Constitutional:       General: She is not in acute distress. Appearance: Normal appearance. She is well-developed. She is not ill-appearing, toxic-appearing or diaphoretic. HENT:      Head: Normocephalic and atraumatic. Right Ear: Hearing, tympanic membrane, ear canal and external ear normal. No mastoid tenderness. No hemotympanum. Tympanic membrane is not perforated, erythematous or bulging. Left Ear: Hearing, tympanic membrane, ear canal and external ear normal. No mastoid tenderness. No hemotympanum. Tympanic membrane is not perforated, erythematous or bulging. Nose: Congestion present. No rhinorrhea. Mouth/Throat:      Mouth: Mucous membranes are moist.      Pharynx: Oropharynx is clear. Uvula midline. Tonsils: No tonsillar abscesses. Eyes:      General: No scleral icterus. Conjunctiva/sclera: Conjunctivae normal.      Right eye: Right conjunctiva is not injected. No hemorrhage. Left eye: Left conjunctiva is not injected. No hemorrhage. Neck:      Musculoskeletal: Normal range of motion and neck supple. Thyroid: No thyromegaly. Trachea: Trachea normal.   Cardiovascular:      Rate and Rhythm: Normal rate and regular rhythm. No extrasystoles are present. Chest Wall: PMI is not displaced. Heart sounds: Normal heart sounds. No murmur. No friction rub. No gallop. Pulmonary:      Effort: Pulmonary effort is normal. No respiratory distress. Breath sounds: Normal breath sounds. Abdominal:      General: Bowel sounds are normal. There is no distension. Palpations: Abdomen is soft. There is no hepatomegaly or splenomegaly. Tenderness: There is no abdominal tenderness. There is no right CVA tenderness or left CVA tenderness. Hernia: No hernia is present. Musculoskeletal:      Lumbar back: Normal.   Lymphadenopathy:      Head:      Right side of head: No submental, submandibular, tonsillar or occipital adenopathy. Left side of head: No submental, submandibular, tonsillar or occipital adenopathy. Cervical: No cervical adenopathy. Upper Body:      Right upper body: No supraclavicular adenopathy. Left upper body: No supraclavicular adenopathy. Skin:     General: Skin is warm and dry. Capillary Refill: Capillary refill takes less than 2 seconds. Coloration: Skin is not jaundiced or pale. Findings: No rash. Comments: Skin warm and dry to touch, no rashes noted on exposed surfaces. Neurological:      Mental Status: She is alert and oriented to person, place, and time. She is not disoriented. Psychiatric:         Mood and Affect: Mood normal.         Behavior: Behavior normal. Behavior is cooperative.          DIAGNOSTIC RESULTS

## 2021-04-20 ENCOUNTER — CARE COORDINATION (OUTPATIENT)
Dept: CARE COORDINATION | Age: 56
End: 2021-04-20

## 2021-04-20 NOTE — CARE COORDINATION
Patient contacted regarding recent visit for viral symptoms.  contacted the patient by telephone to perform post discharge call. Verified name and  with patient as identifiers. Provided introduction to self, and reason for call due to viral symptoms of infection and/or exposure to COVID-19. Call within 2 business days of discharge: Yes       Patient presented to emergency department/flu clinic with complaints of viral symptoms/exposure to COVID. Patient reports symptoms are improving. Due to no new or worsening symptoms the RN CTN/ACSAMANTHA was not notified for escalation. Discussed exposure protocols and quarantine with CDC Guidelines What To Do If You Are Sick    Patient was given an opportunity for questions and concerns. Stay home except to get medical care    Separate yourself from other people and animals in your home    Call ahead before visiting your doctor    Wear a facemask    Cover your coughs and sneezes    Clean your hands often    Avoid sharing personal household items    Clean all high-touch surfaces everyday    Monitor your symptoms  Seek prompt medical attention if your illness is worsening (e.g., difficulty breathing). Before seeking care, call your healthcare provider and tell them that you have, or are being evaluated for, COVID-19. Put on a facemask before you enter the facility. These steps will help the healthcare provider's office to keep other people in the office or waiting room from getting infected or exposed. Ask your healthcare provider to call the local or [facility] health department. Persons who are placed under If you have a medical emergency and need to call 911, notify the dispatch personnel that you have, or are being evaluated for COVID-19. If possible, put on a facemask before emergency medical services arrive.     The patient agrees to contact the [employer] exposure line [phone], local [facility] department [place] [facility]: ([phone]) and PCP office for questions related to their healthcare. Author provided contact information for future reference. Patient/family/caregiver given information for [employer] and agrees to enroll yes  Patient's preferred e-mail: [email or web address]   Patient's preferred phone number: [phone]  Based on Loop alert triggers, patient will be contacted by nurse care manager for worsening symptoms.

## 2021-04-21 LAB — SARS-COV-2: NOT DETECTED

## 2021-08-30 ENCOUNTER — HOSPITAL ENCOUNTER (OUTPATIENT)
Dept: WOMENS IMAGING | Age: 56
Discharge: HOME | End: 2021-08-30
Payer: OTHER GOVERNMENT

## 2021-08-30 DIAGNOSIS — Z12.31 VISIT FOR SCREENING MAMMOGRAM: ICD-10-CM

## 2021-08-30 PROCEDURE — 77063 BREAST TOMOSYNTHESIS BI: CPT

## 2021-09-29 ENCOUNTER — HOSPITAL ENCOUNTER (EMERGENCY)
Age: 56
Discharge: HOME | End: 2021-09-29
Attending: EMERGENCY MEDICINE
Payer: OTHER GOVERNMENT

## 2021-09-29 VITALS
RESPIRATION RATE: 16 BRPM | SYSTOLIC BLOOD PRESSURE: 122 MMHG | TEMPERATURE: 97.8 F | DIASTOLIC BLOOD PRESSURE: 66 MMHG | HEART RATE: 78 BPM | OXYGEN SATURATION: 95 %

## 2021-09-29 DIAGNOSIS — N30.01 ACUTE CYSTITIS WITH HEMATURIA: Primary | ICD-10-CM

## 2021-09-29 LAB
BILIRUBIN URINE: ABNORMAL
BLOOD, URINE: NEGATIVE
CHARACTER, URINE: CLEAR
COLOR: ABNORMAL
GLUCOSE URINE: 100 MG/DL
KETONES, URINE: ABNORMAL
LEUKOCYTE ESTERASE, URINE: ABNORMAL
NITRITE, URINE: POSITIVE
PH UA: 5 (ref 5–9)
PROTEIN UA: 30 MG/DL
SPECIFIC GRAVITY UA: >= 1.03 (ref 1–1.03)
UROBILINOGEN, URINE: 1 EU/DL (ref 0.2–1)

## 2021-09-29 PROCEDURE — 99213 OFFICE O/P EST LOW 20 MIN: CPT

## 2021-09-29 PROCEDURE — 81003 URINALYSIS AUTO W/O SCOPE: CPT

## 2021-09-29 PROCEDURE — 99213 OFFICE O/P EST LOW 20 MIN: CPT | Performed by: EMERGENCY MEDICINE

## 2021-09-29 RX ORDER — SULFAMETHOXAZOLE AND TRIMETHOPRIM 800; 160 MG/1; MG/1
1 TABLET ORAL 2 TIMES DAILY
Qty: 10 TABLET | Refills: 0 | Status: SHIPPED | OUTPATIENT
Start: 2021-09-29 | End: 2021-10-04

## 2021-09-29 RX ORDER — PHENAZOPYRIDINE HYDROCHLORIDE 200 MG/1
200 TABLET, FILM COATED ORAL 3 TIMES DAILY PRN
Qty: 9 TABLET | Refills: 0 | Status: SHIPPED | OUTPATIENT
Start: 2021-09-29 | End: 2021-10-08

## 2021-09-29 ASSESSMENT — ENCOUNTER SYMPTOMS
ABDOMINAL PAIN: 0
EYE PAIN: 0
STRIDOR: 0
EYE DISCHARGE: 0
NAUSEA: 0
DIARRHEA: 0
TROUBLE SWALLOWING: 0
COUGH: 0
WHEEZING: 0
VOICE CHANGE: 0
FACIAL SWELLING: 0
EYE REDNESS: 0
SINUS PRESSURE: 0
SHORTNESS OF BREATH: 0
BACK PAIN: 0
SORE THROAT: 0
VOMITING: 0
CONSTIPATION: 0
BLOOD IN STOOL: 0
CHOKING: 0

## 2021-09-29 ASSESSMENT — PAIN SCALES - GENERAL: PAINLEVEL_OUTOF10: 7

## 2021-09-29 NOTE — ED PROVIDER NOTES
arthritis) ([facility] [address]     Rheumatoid arthritis(714.0)        SURGICAL HISTORY     Patient  has a past surgical history that includes  section; Tubal ligation; Endometrial ablation; Endoscopy, colon, diagnostic (); transesophageal echocardiogram; and Hysterectomy. CURRENT MEDICATIONS       Discharge Medication List as of 2021  3:59 PM      CONTINUE these medications which have NOT CHANGED    Details   Metoprolol Succinate 50 MG CS24 Take by mouthHistorical Med      butalbital-acetaminophen-caffeine (FIORICET, ESGIC) per tablet Take 1 tablet by mouth every 4 hours as needed. ALLERGIES     Patient is has No Known Allergies. FAMILY HISTORY     Patient'sfamily history includes Breast Cancer (age of onset: 62) in her sister; Breast Cancer (age of onset: 61) in her paternal aunt; Breast Cancer (age of onset: 79) in her paternal aunt; Breast Cancer (age of onset: [de-identified]) in her maternal grandmother; Cancer in her father and mother; Stroke in her mother. SOCIAL HISTORY     Patient  reports that she has quit smoking. Her smoking use included cigarettes. She quit after 6.00 years of use. She has never used smokeless tobacco. She reports current alcohol use. She reports that she does not use drugs. PHYSICAL EXAM     ED TRIAGE VITALS  BP: 122/66, Temp: 97.8 °F (36.6 °C), Pulse: 78, Resp: 16, SpO2: 95 %  Physical Exam  Vitals and nursing note reviewed. Constitutional:       General: She is not in acute distress. Appearance: She is well-developed. She is not ill-appearing. Comments: Moist membranes, normal airway   HENT:      Head: Normocephalic and atraumatic. Right Ear: Tympanic membrane and external ear normal.      Left Ear: Tympanic membrane and external ear normal.      Nose: Nose normal.      Mouth/Throat:      Pharynx: No oropharyngeal exudate. Comments: Oropharynx normal  Eyes:      General: No scleral icterus. Right eye: No discharge.          Left eye: No discharge. Extraocular Movements:      Right eye: Normal extraocular motion. Left eye: Normal extraocular motion. Conjunctiva/sclera: Conjunctivae normal.      Pupils: Pupils are equal, round, and reactive to light. Comments: Conjunctiva clear nonicteric   Neck:      Thyroid: No thyromegaly. Vascular: No JVD. Comments: No Meningismus  Cardiovascular:      Rate and Rhythm: Normal rate and regular rhythm. Pulses: Normal pulses. Heart sounds: Normal heart sounds, S1 normal and S2 normal. No murmur heard. No friction rub. No gallop. Pulmonary:      Effort: Pulmonary effort is normal. No tachypnea or respiratory distress. Breath sounds: Normal breath sounds. No stridor. No decreased breath sounds, wheezing, rhonchi or rales. Comments: No cough, lungs clear  Chest:      Chest wall: No tenderness. Abdominal:      General: Bowel sounds are normal. There is no distension. Palpations: Abdomen is soft. There is no mass. Tenderness: There is abdominal tenderness in the suprapubic area. There is no right CVA tenderness, left CVA tenderness, guarding or rebound. Musculoskeletal:         General: No tenderness. Normal range of motion. Cervical back: Normal range of motion. Comments: Joints and extremities normal   Lymphadenopathy:      Cervical: No cervical adenopathy. Right cervical: No superficial cervical adenopathy. Left cervical: No superficial cervical adenopathy. Skin:     General: Skin is warm and dry. Findings: No erythema or rash. Comments: No rash or bruising   Neurological:      Mental Status: She is alert and oriented to person, place, and time. Cranial Nerves: No cranial nerve deficit. Motor: No abnormal muscle tone. Coordination: Coordination normal.      Deep Tendon Reflexes: Reflexes are normal and symmetric.  Reflexes normal.      Comments: Appropriate, no focal findings   Psychiatric: Behavior: Behavior normal.         Thought Content: Thought content normal.         Judgment: Judgment normal.         DIAGNOSTIC RESULTS   Labs:   Results for orders placed or performed during the hospital encounter of 09/29/21   Urinalysis   Result Value Ref Range    Glucose, Ur 100 (A) NEGATIVE mg/dl    Bilirubin Urine Small (A) NEGATIVE    Ketones, Urine Trace (A) NEGATIVE    Specific Gravity, UA >=1.030 1.002 - 1.030    Blood, Urine Negative NEGATIVE    pH, UA 5.00 5.0 - 9.0    Protein, UA 30 (A) NEGATIVE mg/dl    Urobilinogen, Urine 1.00 0.2 - 1.0 eu/dl    Nitrite, Urine Positive (A) NEGATIVE    Leukocyte Esterase, Urine Trace (A) NEGATIVE    Color, UA Orange STRAW-YELLOW    Character, Urine Clear CLEAR-SL CLOUD       IMAGING:  No orders to display     URGENT CARE COURSE:     Vitals:    09/29/21 1519   BP: 122/66   Pulse: 78   Resp: 16   Temp: 97.8 °F (36.6 °C)   TempSrc: Infrared   SpO2: 95%       Medications - No data to display  PROCEDURES:  None  FINALIMPRESSION      1. Acute cystitis with hematuria        DISPOSITION/PLAN   DISPOSITION Decision To Discharge 09/29/2021 03:57:07 PM  Nontoxic, well-hydrated, normal airway. No sepsis or CNS infection. Abdomen nonsurgical.  No evidence of pyelonephritis or urosepsis. Patient has acute cystitis. Will treat with Bactrim, Pyridium, Tylenol, increased oral clear liquids, rest in cool air conditioned space. Patient to follow-up with PCP in 5 days if problems persist, and she understands to go to ED if worse.   PATIENT REFERRED TO:  Malcom Oviedo, APRN - [address] [facility]  [phone]    Schedule an appointment as soon as possible for a visit in 5 days  Recheck if problems persist, go to emergency if worse    DISCHARGE MEDICATIONS:  Discharge Medication List as of 9/29/2021  3:59 PM      START taking these medications    Details   sulfamethoxazole-trimethoprim (BACTRIM DS) 800-160 MG per tablet Take 1 tablet by mouth 2 times daily for 5 days, Disp-10 tablet, R-0Print      phenazopyridine (PYRIDIUM) 200 MG tablet Take 1 tablet by mouth 3 times daily as needed for Pain (dysuria), Disp-9 tablet, R-0Print           Discharge Medication List as of 9/29/2021  3:59 PM          MD Rishi Sierra MD  09/29/21 5675

## 2021-09-29 NOTE — ED TRIAGE NOTES
having pressure/burning with urination, urgency, did take an azo this morning, started yesterday morning

## 2021-11-13 ENCOUNTER — HOSPITAL ENCOUNTER (EMERGENCY)
Age: 56
Discharge: HOME | End: 2021-11-13
Attending: EMERGENCY MEDICINE
Payer: OTHER GOVERNMENT

## 2021-11-13 VITALS
TEMPERATURE: 97.6 F | HEART RATE: 81 BPM | SYSTOLIC BLOOD PRESSURE: 141 MMHG | OXYGEN SATURATION: 99 % | WEIGHT: 135 LBS | DIASTOLIC BLOOD PRESSURE: 84 MMHG | HEIGHT: 67 IN | BODY MASS INDEX: 21.19 KG/M2 | RESPIRATION RATE: 16 BRPM

## 2021-11-13 DIAGNOSIS — N30.00 ACUTE CYSTITIS WITHOUT HEMATURIA: Primary | ICD-10-CM

## 2021-11-13 LAB
BILIRUBIN URINE: NEGATIVE
BLOOD, URINE: NEGATIVE
CHARACTER, URINE: CLEAR
COLOR: YELLOW
GLUCOSE URINE: NEGATIVE MG/DL
KETONES, URINE: ABNORMAL
LEUKOCYTE ESTERASE, URINE: ABNORMAL
NITRITE, URINE: POSITIVE
PH UA: 5.5 (ref 5–9)
PROTEIN UA: 30 MG/DL
SPECIFIC GRAVITY UA: >= 1.03 (ref 1–1.03)
UROBILINOGEN, URINE: 0.2 EU/DL (ref 0.2–1)

## 2021-11-13 PROCEDURE — 99213 OFFICE O/P EST LOW 20 MIN: CPT | Performed by: EMERGENCY MEDICINE

## 2021-11-13 PROCEDURE — 99213 OFFICE O/P EST LOW 20 MIN: CPT

## 2021-11-13 PROCEDURE — 81003 URINALYSIS AUTO W/O SCOPE: CPT

## 2021-11-13 RX ORDER — PHENAZOPYRIDINE HYDROCHLORIDE 200 MG/1
200 TABLET, FILM COATED ORAL 3 TIMES DAILY PRN
Qty: 9 TABLET | Refills: 0 | Status: SHIPPED | OUTPATIENT
Start: 2021-11-13 | End: 2022-01-15

## 2021-11-13 RX ORDER — CIPROFLOXACIN 500 MG/1
500 TABLET, FILM COATED ORAL 2 TIMES DAILY
Qty: 14 TABLET | Refills: 0 | Status: SHIPPED | OUTPATIENT
Start: 2021-11-13 | End: 2021-11-20

## 2021-11-13 ASSESSMENT — ENCOUNTER SYMPTOMS
SORE THROAT: 0
EYE REDNESS: 0
FACIAL SWELLING: 0
VOMITING: 0
COUGH: 0
TROUBLE SWALLOWING: 0
CHOKING: 0
SHORTNESS OF BREATH: 0
NAUSEA: 0
EYE PAIN: 0
SINUS PRESSURE: 0
EYE DISCHARGE: 0
VOICE CHANGE: 0
BLOOD IN STOOL: 0
DIARRHEA: 0
BACK PAIN: 0
WHEEZING: 0
CONSTIPATION: 0
STRIDOR: 0
ABDOMINAL PAIN: 0

## 2021-11-13 ASSESSMENT — PAIN SCALES - GENERAL: PAINLEVEL_OUTOF10: 8

## 2021-11-13 NOTE — ED NOTES
To [facility] with complaints of dysuria.  Started 11/13/21     Ida Mccormick RN  11/13/21 8340

## 2021-11-13 NOTE — ED PROVIDER NOTES
[phone]       Chief Complaint   Patient presents with    Dysuria       Nurses Notes reviewed and I agree except as noted in the HPI. HISTORY OF PRESENT ILLNESS   Elke Villarreal is a 55 y.o. female who presents with dysuria, urinary urgency and frequency after taking a bath with a bath balm. She rates dysuria at 8 out of 10 in severity. No abdominal pain, back pain, fever, vomiting, hematuria, vaginal discharge. Patient states symptoms are identical to previous cystitis, treated with Bactrim September 30, 2021. No history of pyelonephritis, urosepsis, renal colic. No possibility of STD. GFR 87  REVIEW OF SYSTEMS     Review of Systems   Constitutional: Negative for appetite change, chills, fatigue, fever and unexpected weight change. HENT: Negative for congestion, ear discharge, ear pain, facial swelling, hearing loss, nosebleeds, postnasal drip, sinus pressure, sore throat, trouble swallowing and voice change. Eyes: Negative for pain, discharge, redness and visual disturbance. Respiratory: Negative for cough, choking, shortness of breath, wheezing and stridor. No shortness of breath cough, wheezing, stridor   Cardiovascular: Negative for chest pain and leg swelling. No chest pain, dizziness, syncope   Gastrointestinal: Negative for abdominal pain, blood in stool, constipation, diarrhea, nausea and vomiting. No abdominal pain or vomiting   Genitourinary: Positive for dysuria, frequency and urgency. Negative for flank pain, hematuria, vaginal bleeding and vaginal discharge. No Possibility of STD   Musculoskeletal: Negative for arthralgias, back pain, neck pain and neck stiffness. Skin: Negative for rash. Neurological: Negative for dizziness, seizures, syncope, weakness, light-headedness and headaches. No headache or dizziness   Hematological: Negative for adenopathy. Does not bruise/bleed easily. Psychiatric/Behavioral: Negative for confusion, sleep disturbance and suicidal ideas. The patient is not nervous/anxious. All other systems reviewed and are negative. PAST MEDICAL HISTORY         Diagnosis Date    Migraines     RA (rheumatoid arthritis) ([facility] [address]     Rheumatoid arthritis(714.0)        SURGICAL HISTORY     Patient  has a past surgical history that includes  section; Tubal ligation; Endometrial ablation; Endoscopy, colon, diagnostic (); transesophageal echocardiogram; and Hysterectomy. CURRENT MEDICATIONS       Discharge Medication List as of 2021  5:30 PM      CONTINUE these medications which have NOT CHANGED    Details   Metoprolol Succinate 50 MG CS24 Take by mouthHistorical Med      butalbital-acetaminophen-caffeine (FIORICET, ESGIC) per tablet Take 1 tablet by mouth every 4 hours as needed. ALLERGIES     Patient is has No Known Allergies. FAMILY HISTORY     Patient'sfamily history includes Breast Cancer (age of onset: 62) in her sister; Breast Cancer (age of onset: 61) in her paternal aunt; Breast Cancer (age of onset: 79) in her paternal aunt; Breast Cancer (age of onset: [de-identified]) in her maternal grandmother; Cancer in her father and mother; Stroke in her mother. SOCIAL HISTORY     Patient  reports that she has quit smoking. Her smoking use included cigarettes. She quit after 6.00 years of use. She has never used smokeless tobacco. She reports current alcohol use. She reports that she does not use drugs. PHYSICAL EXAM     ED TRIAGE VITALS  BP: (!) 141/84, Temp: 97.6 °F (36.4 °C), Pulse: 81, Resp: 16, SpO2: 99 %  Physical Exam  Vitals and nursing note reviewed. Constitutional:       General: She is not in acute distress. Appearance: She is well-developed. She is not ill-appearing. Comments: Moist membranes, normal airway   HENT:      Head: Normocephalic and atraumatic.       Right Ear: Tympanic membrane and external ear normal.      Left Ear: Tympanic membrane and external ear normal.      Nose: Nose normal.      Mouth/Throat:      Pharynx: No oropharyngeal exudate. Comments: Oropharynx normal  Eyes:      General: No scleral icterus. Right eye: No discharge. Left eye: No discharge. Extraocular Movements:      Right eye: Normal extraocular motion. Left eye: Normal extraocular motion. Conjunctiva/sclera: Conjunctivae normal.      Pupils: Pupils are equal, round, and reactive to light. Comments: Conjunctiva clear   Neck:      Thyroid: No thyromegaly. Vascular: No JVD. Comments: No meningismus  Cardiovascular:      Rate and Rhythm: Normal rate and regular rhythm. Pulses: Normal pulses. Heart sounds: Normal heart sounds, S1 normal and S2 normal. No murmur heard. No friction rub. No gallop. Pulmonary:      Effort: Pulmonary effort is normal. No tachypnea or respiratory distress. Breath sounds: Normal breath sounds. No stridor. No decreased breath sounds, wheezing, rhonchi or rales. Comments: No cough, lungs clear  Chest:      Chest wall: No tenderness. Abdominal:      General: Bowel sounds are normal. There is no distension. Palpations: Abdomen is soft. There is no mass. Tenderness: There is no abdominal tenderness. There is no right CVA tenderness, left CVA tenderness, guarding or rebound. Comments: Soft nontender   Musculoskeletal:         General: No tenderness. Normal range of motion. Cervical back: Normal range of motion. Comments: Joints and extremities normal   Lymphadenopathy:      Cervical: No cervical adenopathy. Right cervical: No superficial cervical adenopathy. Left cervical: No superficial cervical adenopathy. Skin:     General: Skin is warm and dry. Findings: No erythema or rash. Comments: No rash or bruising   Neurological:      Mental Status: She is alert and oriented to person, place, and time. Cranial Nerves:  No cranial nerve deficit. Motor: No abnormal muscle tone. Coordination: Coordination normal.      Deep Tendon Reflexes: Reflexes are normal and symmetric. Reflexes normal.      Comments: Appropriate, no focal finding   Psychiatric:         Behavior: Behavior normal.         Thought Content: Thought content normal.         Judgment: Judgment normal.         DIAGNOSTIC RESULTS   Labs:   Results for orders placed or performed during the hospital encounter of 11/13/21   Urinalysis   Result Value Ref Range    Glucose, Ur Negative NEGATIVE mg/dl    Bilirubin Urine Negative NEGATIVE    Ketones, Urine Trace (A) NEGATIVE    Specific Gravity, UA >=1.030 1.002 - 1.030    Blood, Urine Negative NEGATIVE    pH, UA 5.50 5.0 - 9.0    Protein, UA 30 (A) NEGATIVE mg/dl    Urobilinogen, Urine 0.20 0.2 - 1.0 eu/dl    Nitrite, Urine Positive (A) NEGATIVE    Leukocyte Esterase, Urine Trace (A) NEGATIVE    Color, UA Yellow STRAW-YELLOW    Character, Urine Clear CLEAR-SL CLOUD       IMAGING:  No orders to display     URGENT CARE COURSE:     Vitals:    11/13/21 1653   BP: (!) 141/84   Pulse: 81   Resp: 16   Temp: 97.6 °F (36.4 °C)   TempSrc: Temporal   SpO2: 99%   Weight: 135 lb (61.2 kg)   Height: 5' 7\" (1.702 m)       Medications - No data to display  PROCEDURES:  None  FINALIMPRESSION      1. Acute cystitis without hematuria        DISPOSITION/PLAN   DISPOSITION Decision To Discharge 11/13/2021 05:25:40 PM  Nontoxic, well-hydrated, normal airway. No sepsis or CNS infection. Abdomen nonsurgical.  No evidence of pyelonephritis or urosepsis. Patient has acute cystitis. Will treat with Cipro, Pyridium, Tylenol, increased oral clear liquids, rest.  Patient to follow-up with PCP in 5 days if problems persist, and she understands to go to ED if worse.   PATIENT REFERRED TO:  ALY Drake - CNP  [address] [facility] [address]  [phone]    Schedule an appointment as soon as possible for a visit in 5 days  Recheck 5 days if problems persist, go to emergency if worse    DISCHARGE MEDICATIONS:  Discharge Medication List as of 11/13/2021  5:30 PM      START taking these medications    Details   ciprofloxacin (CIPRO) 500 MG tablet Take 1 tablet by mouth 2 times daily for 14 doses, Disp-14 tablet, R-0Print      phenazopyridine (PYRIDIUM) 200 MG tablet Take 1 tablet by mouth 3 times daily as needed for Pain, Disp-9 tablet, R-0Print           Discharge Medication List as of 11/13/2021  5:30 PM          MD Rosina Abarca MD  11/13/21 6786

## 2021-12-11 NOTE — ED TRIAGE NOTES
Patient Name: Ariana Michel  : 1956    MRN: [id number]                              Today's Date: 2021       Admit Date: 12/10/2021    Visit Dx:     ICD-10-CM ICD-9-CM   1. Status post total replacement of left hip  Z96.642 V43.64     Patient Active Problem List   Diagnosis   • Status post total replacement of hip     Past Medical History:   Diagnosis Date   • Anxiety    • Bilateral leg edema    • COVID-19 2020   • Crohn's disease (HCC)     not active   • Hypertension    • Osteoarthritis    • PONV (postoperative nausea and vomiting)    • RLS (restless legs syndrome)      Past Surgical History:   Procedure Laterality Date   • LAPAROSCOPIC CHOLECYSTECTOMY        General Information     Row Name 21 1411          Physical Therapy Time and Intention    Document Type evaluation  -HS     Mode of Treatment individual therapy; physical therapy  -HS     Row Name 21 141          General Information    Patient Profile Reviewed yes  -HS     Prior Level of Function independent:; all household mobility; community mobility; driving  -HS     Existing Precautions/Restrictions fall  -HS     Barriers to Rehab none identified  -HS     Row Name 21 1411          Living Environment    Lives With spouse  -HS     Row Name 21 1411          Home Main Entrance    Number of Stairs, Main Entrance three  -HS     Stair Railings, Main Entrance none  -HS     Row Name 21 1411          Cognition    Orientation Status (Cognition) oriented x 4  -HS           User Key  (r) = Recorded By, (t) = Taken By, (c) = Cosigned By    Initials Name Provider Type    HS Chapis Woodall PT Physical Therapist               Mobility     Row Name 21 1413          Bed Mobility    Bed Mobility bed mobility (all) activities  -HS     All Activities, [place] (Bed Mobility) verbal cues; supervision  -HS     Comment (Bed Mobility) sit to sidelying performed to practice for home  -HS     Row Name 21 1413           Pt to ed with cp/epigastric abd pain that started 24 hours ago and has progressed. Pt rates pain 8/10 and denies taking otc medications prior to arrival and denies any injury. ekg obtained in intake. Sit-Stand Transfer    Sit-Stand [place] (Transfers) supervision; verbal cues  -HS     Assistive Device (Sit-Stand Transfers) walker, front-wheeled  -HS     Row Name 12/11/21 1413          Gait/Stairs (Locomotion)    [place] Level (Gait) supervision  Pt requires B UE support to mimic lack of HR at home; able to perform safety without assist for balance  -HS     Assistive Device (Gait) walker, front-wheeled  -HS     Distance in Feet (Gait) 200'x2  -HS     Bilateral Gait Deviations --  B decrease in knee flexion; requires cueing for heel toe gait pattern  -HS     [place] Level (Stairs) contact guard  Pt requires B UE support to mimic lack of HR at home; able to perform safety without assist for balance  -HS     Number of Steps (Stairs) 8  -HS     Ascending Technique (Stairs) step-to-step  -HS     Descending Technique (Stairs) step-to-step  -HS     Comment (Gait/Stairs) Practiced stair climbing with family providing assist to prepare for home  -HS     Row Name 12/11/21 1413          Mobility    Extremity Weight-bearing Status left lower extremity  -HS     Left Lower Extremity (Weight-bearing Status) weight-bearing as tolerated (WBAT)  -HS           User Key  (r) = Recorded By, (t) = Taken By, (c) = Cosigned By    Initials Name Provider Type    HS Chapis Woodall PT Physical Therapist               Obj/Interventions     Row Name 12/11/21 1419          Range of Motion Comprehensive    General Range of Motion bilateral upper extremity ROM WFL  -HS     Comment, General Range of Motion Pt able to move B LE through functional ROM.  -HS     Row Name 12/11/21 1419          Strength Comprehensive (MMT)    Comment, General Manual Muscle Testing (MMT) Assessment B UE and LE WFL per assessment of mobility  -HS     Row Name 12/11/21 1419          Motor Skills    Therapeutic Exercise hip  handout provided; pt reporting understanding  -HS     Row Name 12/11/21 1419          Balance    Comment, Balance Pt with good seated  balance, requires RW for standing balance  -           User Key  (r) = Recorded By, (t) = Taken By, (c) = Cosigned By    Initials Name Provider Type    Chapis Solis PT Physical Therapist               Goals/Plan    No documentation.                Clinical Impression     Row Name 12/11/21 1420          Pain    Additional Documentation --  Pt reporting minimal to moderate pain in L hip, does not rate  -HS     Row Name 12/11/21 1420          Plan of Care Review    Plan of Care Reviewed With patient; daughter  -     Outcome Summary Pt is a 66 yo F s/p L BOO on 12/10/21. Pt reporting prior independence with all of her ADLs at home. During session, pt requires VC for safe use of RW, but able to implement cueing appropriately. Pt demonstrates safe mobility with STS transfers and supine<>sit transfers, and appropriate positioning of RW. Requires minimal cueing to improve gait mechanics, but able to ambulate safely in hallway. Stair climbing practiced to prepare pt for dc home, and pt able to complete safety with family member. Pt safe to dc home with  PT.  -     Row Name 12/11/21 1420          Therapy Assessment/Plan (PT)    Criteria for Skilled Interventions Met (PT) no; does not meet criteria for skilled intervention  -     Row Name 12/11/21 1420          Vital Signs    O2 Delivery Pre Treatment room air  -HS     O2 Delivery Post Treatment room air  -HS     Pre Patient Position Sitting  -HS     Post Patient Position Sitting  -HS     Row Name 12/11/21 1420          Positioning and Restraints    Pre-Treatment Position in bed  -HS     Post Treatment Position bed  -HS     In Bed notified nsg; with family/caregiver  Pt reports being up ad michi in room  -HS           User Key  (r) = Recorded By, (t) = Taken By, (c) = Cosigned By    Initials Name Provider Type    Chapis Solis PT Physical Therapist               Outcome Measures    No documentation.                              Physical Therapy Education                  Title: PT OT SLP Therapies (Done)     Topic: Physical Therapy (Done)     Point: Mobility training (Done)     Learning Progress Summary           Patient Acceptance, E, VU by  at 12/11/2021 1427                   Point: Home exercise program (Done)     Learning Progress Summary           Patient Acceptance, E, VU by HS at 12/11/2021 1427                   Point: Body mechanics (Done)     Learning Progress Summary           Patient Acceptance, E, VU by HS at 12/11/2021 1427                   Point: Precautions (Done)     Learning Progress Summary           Patient Acceptance, E, VU by HS at 12/11/2021 1427                               User Key     Initials Effective Dates Name Provider Type [facility] 09/13/21 -  Chapis Woodall PT Physical Therapist PT              PT Recommendation and Plan     Plan of Care Reviewed With: patient, daughter  Outcome Summary: Pt is a 66 yo F s/p L BOO on 12/10/21. Pt reporting prior independence with all of her ADLs at home. During session, pt requires VC for safe use of RW, but able to implement cueing appropriately. Pt demonstrates safe mobility with STS transfers and supine<>sit transfers, and appropriate positioning of RW. Requires minimal cueing to improve gait mechanics, but able to ambulate safely in hallway. Stair climbing practiced to prepare pt for dc home, and pt able to complete safety with family member. Pt safe to dc home with  PT.     Time Calculation:    PT Charges     Row Name 12/11/21 1428             Time Calculation    Start Time 1320  -HS      Stop Time 1346  -HS      Time Calculation (min) 26 min  -HS      PT Received On 12/11/21  -            User Key  (r) = Recorded By, (t) = Taken By, (c) = Cosigned By    Initials Name Provider Type     Chapis Woodall PT Physical Therapist              Therapy Charges for Today     Code Description Service Date Service Provider Modifiers Qty    [id number] HC PT EVAL LOW COMPLEXITY 4 12/11/2021 Jose C  Chapis, PT GP 1               Chapis Woodall, PT  12/11/2021

## 2022-01-15 ENCOUNTER — HOSPITAL ENCOUNTER (EMERGENCY)
Age: 57
Discharge: HOME | End: 2022-01-15
Payer: OTHER GOVERNMENT

## 2022-01-15 VITALS
SYSTOLIC BLOOD PRESSURE: 120 MMHG | TEMPERATURE: 98.3 F | RESPIRATION RATE: 16 BRPM | HEART RATE: 78 BPM | OXYGEN SATURATION: 98 % | DIASTOLIC BLOOD PRESSURE: 70 MMHG

## 2022-01-15 DIAGNOSIS — U07.1 COVID-19 VIRUS INFECTION: Primary | ICD-10-CM

## 2022-01-15 PROCEDURE — 99213 OFFICE O/P EST LOW 20 MIN: CPT

## 2022-01-15 PROCEDURE — 99213 OFFICE O/P EST LOW 20 MIN: CPT | Performed by: EMERGENCY MEDICINE

## 2022-01-15 RX ORDER — PSEUDOEPHEDRINE HYDROCHLORIDE 30 MG/1
30 TABLET ORAL EVERY 4 HOURS PRN
Qty: 30 TABLET | Refills: 0 | Status: SHIPPED | OUTPATIENT
Start: 2022-01-15 | End: 2022-01-22

## 2022-01-15 ASSESSMENT — ENCOUNTER SYMPTOMS
SHORTNESS OF BREATH: 0
COLOR CHANGE: 0
NAUSEA: 0
VOMITING: 0
SINUS PRESSURE: 1
DIARRHEA: 0
COUGH: 1
RHINORRHEA: 1
ABDOMINAL PAIN: 0
SORE THROAT: 0

## 2022-01-15 NOTE — ED PROVIDER NOTES
[facility]  Urgent Care Encounter       CHIEF COMPLAINT       Chief Complaint   Patient presents with    Positive For Covid-19     home test       Nurses Notes reviewed and I agree except as noted in the HPI. HISTORY OF PRESENT ILLNESS   Elke Villarreal is a 55 y.o. female who presents for treatment of COVID-19 symptoms. Patient has had a positive COVID-19 test.  She has had COVID symptoms for approximately 4 days patient states that her main symptoms are headache, body aches, chills. Patient states she has on and off nasal congestion. Occasional cough. No shortness of breath. The patient's daughter suggested she come to the urgent care to be prescribed hydroxychloroquine for the treatment of her symptoms. HPI    REVIEW OF SYSTEMS     Review of Systems   Constitutional: Positive for fatigue. Negative for fever. HENT: Positive for congestion, rhinorrhea and sinus pressure. Negative for sore throat. Respiratory: Positive for cough. Negative for shortness of breath. Cardiovascular: Negative for chest pain. Gastrointestinal: Negative for abdominal pain, diarrhea, nausea and vomiting. Skin: Negative for color change. Neurological: Positive for headaches. PAST MEDICAL HISTORY         Diagnosis Date    Migraines     RA (rheumatoid arthritis) ([facility] [address]     Rheumatoid arthritis(714.0)        SURGICALHISTORY     Patient  has a past surgical history that includes  section; Tubal ligation; Endometrial ablation; Endoscopy, colon, diagnostic (); transesophageal echocardiogram; and Hysterectomy. CURRENT MEDICATIONS       Discharge Medication List as of 1/15/2022  5:06 PM      CONTINUE these medications which have NOT CHANGED    Details   Metoprolol Succinate 50 MG CS24 Take by mouthHistorical Med      butalbital-acetaminophen-caffeine (FIORICET, ESGIC) per tablet Take 1 tablet by mouth every 4 hours as needed.                ALLERGIES     Patient is has No Known Allergies. Patients   There is no immunization history on file for this patient. FAMILY HISTORY     Patient's family history includes Breast Cancer (age of onset: 62) in her sister; Breast Cancer (age of onset: 61) in her paternal aunt; Breast Cancer (age of onset: 79) in her paternal aunt; Breast Cancer (age of onset: [de-identified]) in her maternal grandmother; Cancer in her father and mother; Stroke in her mother. SOCIAL HISTORY     Patient  reports that she has quit smoking. Her smoking use included cigarettes. She quit after 6.00 years of use. She has never used smokeless tobacco. She reports current alcohol use. She reports that she does not use drugs. PHYSICAL EXAM     ED TRIAGE VITALS  BP: 120/70, Temp: 98.3 °F (36.8 °C), Pulse: 78, Resp: 16, SpO2: 98 %,Estimated body mass index is 21.14 kg/m² as calculated from the following:    Height as of 11/13/21: 5' 7\" (1.702 m). Weight as of 11/13/21: 135 lb (61.2 kg). ,No LMP recorded. Patient has had a hysterectomy. Physical Exam  HENT:      Head: Normocephalic. Nose: Rhinorrhea present. No congestion. Mouth/Throat:      Mouth: Mucous membranes are moist.   Cardiovascular:      Rate and Rhythm: Normal rate and regular rhythm. Pulses: Normal pulses. Heart sounds: Normal heart sounds. Pulmonary:      Effort: Pulmonary effort is normal. No respiratory distress. Breath sounds: Normal breath sounds. No wheezing or rhonchi. Skin:     General: Skin is warm and dry. Capillary Refill: Capillary refill takes less than 2 seconds. Neurological:      General: No focal deficit present. Mental Status: She is alert. Psychiatric:         Mood and Affect: Mood normal.         Behavior: Behavior normal.         DIAGNOSTIC RESULTS     Labs:No results found for this visit on 01/15/22.     IMAGING:    No orders to display         EKG:      URGENT CARE COURSE:     Vitals:    01/15/22 1643   BP: 120/70   Pulse: 78   Resp: 16   Temp: 98.3 °F (36.8 °C)   TempSrc: Temporal   SpO2: 98%       Medications - No data to display         PROCEDURES:  None    FINAL IMPRESSION      1. COVID-19 virus infection          DISPOSITION/ PLAN   Presents for treatment of known COVID-19 illness. Patient is requesting off label treatment with hydroxychloroquine. Advised that I cannot prescribe this. Patient symptoms are very mild. I advised that she likely does not need this type of medication. We will treat the symptoms she is having with ibuprofen, Tylenol, drinking plenty of fluids, Sudafed for congestion. Advise to return for worsening cough, chest pain or shortness of breath. I also discussed monoclonal antibody treatment availability which she did not seem to be interested in.         PATIENT REFERRED TO:  ALY Garcia CNP  [facility] [id number]      DISCHARGE MEDICATIONS:  Discharge Medication List as of 1/15/2022  5:06 PM      START taking these medications    Details   pseudoephedrine (DECONGESTANT) 30 MG tablet Take 1 tablet by mouth every 4 hours as needed for Congestion, Disp-30 tablet, R-0Normal             Discharge Medication List as of 1/15/2022  5:06 PM      STOP taking these medications       phenazopyridine (PYRIDIUM) 200 MG tablet Comments:   Reason for Stopping:               Discharge Medication List as of 1/15/2022  5:06 PM          ALY Sandoval CNP    (Please note that portions of this note were completed with a voice recognition program. Efforts were made to edit the dictations but occasionally words are mis-transcribed.)          ALY Sandoval CNP  01/15/22 1103

## 2022-03-28 ENCOUNTER — OFFICE VISIT (OUTPATIENT)
Dept: RHEUMATOLOGY | Age: 57
End: 2022-03-28
Payer: OTHER GOVERNMENT

## 2022-03-28 ENCOUNTER — NURSE ONLY (OUTPATIENT)
Dept: LAB | Age: 57
End: 2022-03-28

## 2022-03-28 VITALS
WEIGHT: 141.2 LBS | SYSTOLIC BLOOD PRESSURE: 134 MMHG | OXYGEN SATURATION: 97 % | HEIGHT: 67 IN | DIASTOLIC BLOOD PRESSURE: 86 MMHG | BODY MASS INDEX: 22.16 KG/M2 | HEART RATE: 92 BPM

## 2022-03-28 DIAGNOSIS — M72.0 DUPUYTREN CONTRACTURE: ICD-10-CM

## 2022-03-28 DIAGNOSIS — M54.50 CHRONIC BILATERAL LOW BACK PAIN WITHOUT SCIATICA: ICD-10-CM

## 2022-03-28 DIAGNOSIS — H04.123 DRY EYES: ICD-10-CM

## 2022-03-28 DIAGNOSIS — Z87.39 H/O RHEUMATOID ARTHRITIS: ICD-10-CM

## 2022-03-28 DIAGNOSIS — G89.29 CHRONIC BILATERAL LOW BACK PAIN WITHOUT SCIATICA: ICD-10-CM

## 2022-03-28 DIAGNOSIS — Z87.39 H/O RHEUMATOID ARTHRITIS: Primary | ICD-10-CM

## 2022-03-28 LAB
ALBUMIN SERPL-MCNC: 4.9 G/DL (ref 3.5–5.1)
ALP BLD-CCNC: 113 U/L (ref 38–126)
ALT SERPL-CCNC: 27 U/L (ref 11–66)
ANION GAP SERPL CALCULATED.3IONS-SCNC: 14 MEQ/L (ref 8–16)
AST SERPL-CCNC: 26 U/L (ref 5–40)
BASOPHILS # BLD: 0.3 %
BASOPHILS ABSOLUTE: 0 THOU/MM3 (ref 0–0.1)
BILIRUB SERPL-MCNC: 0.2 MG/DL (ref 0.3–1.2)
BUN BLDV-MCNC: 13 MG/DL (ref 7–22)
C-REACTIVE PROTEIN: < 0.3 MG/DL (ref 0–1)
CALCIUM SERPL-MCNC: 9.7 MG/DL (ref 8.5–10.5)
CHLORIDE BLD-SCNC: 102 MEQ/L (ref 98–111)
CO2: 24 MEQ/L (ref 23–33)
CREAT SERPL-MCNC: 0.6 MG/DL (ref 0.4–1.2)
EOSINOPHIL # BLD: 0.3 %
EOSINOPHILS ABSOLUTE: 0 THOU/MM3 (ref 0–0.4)
ERYTHROCYTE [DISTWIDTH] IN BLOOD BY AUTOMATED COUNT: 12.4 % (ref 11.5–14.5)
ERYTHROCYTE [DISTWIDTH] IN BLOOD BY AUTOMATED COUNT: 44.2 FL (ref 35–45)
GFR SERPL CREATININE-BSD FRML MDRD: > 90 ML/MIN/1.73M2
GLUCOSE BLD-MCNC: 95 MG/DL (ref 70–108)
HCT VFR BLD CALC: 45.4 % (ref 37–47)
HEMOGLOBIN: 14.7 GM/DL (ref 12–16)
IMMATURE GRANS (ABS): 0.02 THOU/MM3 (ref 0–0.07)
IMMATURE GRANULOCYTES: 0.3 %
LYMPHOCYTES # BLD: 17.9 %
LYMPHOCYTES ABSOLUTE: 1.1 THOU/MM3 (ref 1–4.8)
MCH RBC QN AUTO: 31.3 PG (ref 26–33)
MCHC RBC AUTO-ENTMCNC: 32.4 GM/DL (ref 32.2–35.5)
MCV RBC AUTO: 96.8 FL (ref 81–99)
MONOCYTES # BLD: 8.4 %
MONOCYTES ABSOLUTE: 0.5 THOU/MM3 (ref 0.4–1.3)
NUCLEATED RED BLOOD CELLS: 0 /100 WBC
PLATELET # BLD: 375 THOU/MM3 (ref 130–400)
PMV BLD AUTO: 10.5 FL (ref 9.4–12.4)
POTASSIUM SERPL-SCNC: 4.6 MEQ/L (ref 3.5–5.2)
RBC # BLD: 4.69 MILL/MM3 (ref 4.2–5.4)
RHEUMATOID FACTOR: < 10 IU/ML (ref 0–13)
SEDIMENTATION RATE, ERYTHROCYTE: 18 MM/HR (ref 0–20)
SEG NEUTROPHILS: 72.8 %
SEGMENTED NEUTROPHILS ABSOLUTE COUNT: 4.5 THOU/MM3 (ref 1.8–7.7)
SODIUM BLD-SCNC: 140 MEQ/L (ref 135–145)
TOTAL PROTEIN: 7.9 G/DL (ref 6.1–8)
WBC # BLD: 6.2 THOU/MM3 (ref 4.8–10.8)

## 2022-03-28 PROCEDURE — 99204 OFFICE O/P NEW MOD 45 MIN: CPT | Performed by: NURSE PRACTITIONER

## 2022-03-28 ASSESSMENT — ENCOUNTER SYMPTOMS
SHORTNESS OF BREATH: 0
TROUBLE SWALLOWING: 0
ABDOMINAL PAIN: 0
EYE PAIN: 0
DIARRHEA: 0
CONSTIPATION: 0
EYE ITCHING: 0
COUGH: 0
NAUSEA: 0
BACK PAIN: 1

## 2022-03-28 NOTE — PROGRESS NOTES
[facility]       Date Of Service: 3/28/2022  Provider: [place], APRN - CNP , CNP    Name: Dejuan Tirado   MRN: [id number]    Chief Complaint(s)      Chief Complaint   Patient presents with    New Patient     new patient, RA w/ positive RF, prev. [facility] pt         History of Present illness (HPI)    Dejuan Tirado   is a(n)57 y.o. female with a hx of fibromyalgia, hypertension, headaches, rheumatoid arthritis, TIA referred by MAURICIO Lal* for evaluation of rheumatoid arthritis. Symptoms started: ? 7 years with bilateral elbow pain which progressed to fingers, knees, shoulders. Reported joint swelling in elbows and hands. Was evaluated by Dr. Ralph Apgar and diagnosed with rheumatoid arthritis. Treated with tramadol 3 times per day and tried an oral corticosteroid, however patient uncomfortable with side effects and did not take it. No other treatments pursued. The patient reports pain affecting her fingers, elbows, knees, shoulders, back  Pain on a scale 0-10: 5-6/10  Type of pain: constant fingers, shoulders   Timing: mornings   Aggravating factors: knees: standing for long periods. Cold weather. Back: standing for long periods  Alleviating factors:massages, Back: forward flexion  Current therapy: n/a  Previous therapy:tramadol (some relief), tylenol (some relief), ibuprofen (some relief)    Associated symptoms:  denies swelling/ redness/ warmth  + AM stiffness lasting 45 minutes  + gelling    + dry eyes    + color changes of hands with cold exposure    -denies Photosenstivity, Rash, dry mouth/dry eyes, oral/nasal sores, Raynaud's, digital ulcerations, skin tightening, renal disease,foamy urination, hematuria, sz's, blood clots, pre-term labor loss, AIHA,leukpenia/lymphopenia, thrombocytopenia, hair loss, serositis, arthritis.      - denies enthesitis, dactylitis, nail changes, hx of STD,  personal or family history of Psoriatic arthritis, psoriasis, ank spond,     +fmhx of rheumatoid arthritis in maternal grandmother    Cancer screening:UTD  Travel: n/a  Exposure(s): n/a    Review of Systems    Review of Systems   Constitutional: Negative for fatigue, fever and unexpected weight change. HENT: Negative for congestion and trouble swallowing. Eyes: Negative for pain and itching. Respiratory: Negative for cough and shortness of breath. Cardiovascular: Negative for chest pain and leg swelling. Gastrointestinal: Negative for abdominal pain, constipation, diarrhea and nausea. Endocrine: Negative for cold intolerance and heat intolerance. Genitourinary: Negative for difficulty urinating, frequency and urgency. Musculoskeletal: Positive for arthralgias and back pain. Negative for joint swelling. Skin: Negative for rash. Neurological: Negative for dizziness, weakness, numbness and headaches. Psychiatric/Behavioral: The patient is not nervous/anxious. PAST MEDICAL HISTORY     has a past medical history of Migraines, RA (rheumatoid arthritis) ([facility] [address] and Rheumatoid arthritis(714.0). PAST SURGICAL HISTORY     has a past surgical history that includes  section; Tubal ligation; Endometrial ablation; Endoscopy, colon, diagnostic (); transesophageal echocardiogram; and Hysterectomy. FAMILY HISTORY      Family History   Problem Relation Age of Onset    Stroke Mother     Cancer Mother     Cancer Father     Breast Cancer Sister 62    Breast Cancer Maternal Grandmother [de-identified]    Breast Cancer Paternal Aunt 79    Breast Cancer Paternal Aunt 61       SOCIAL HISTORY     reports that she has quit smoking. Her smoking use included cigarettes. She quit after 6.00 years of use. She has never used smokeless tobacco. She reports current alcohol use. She reports that she does not use drugs.       ALLERGIES   No Known Allergies    CURRENT MEDICATIONS      Current Outpatient Medications:     Metoprolol Succinate 50 MG CS24, Take by mouth, Disp: , Rfl:     butalbital-acetaminophen-caffeine (FIORICET, ESGIC) per tablet, Take 1 tablet by mouth every 4 hours as needed. , Disp: , Rfl:     PHYSICAL EXAMINATION / OBJECTIVE     Objective:  /86 (Site: Left Upper Arm, Position: Sitting, Cuff Size: Medium Adult)   Pulse 92   Ht 5' 7.01\" (1.702 m)   Wt 141 lb 3.2 oz (64 kg)   SpO2 97%   BMI 22.11 kg/m²     General Appearance:   alert and oriented to person, place and time well-developed and well nourished  Physch : appropriate affects ,   Head:  Normocephalic and atraumatic  Eyes: No gross abnormalities. ,  PERRL,  EOMI, Sclera nonicteric, conjunctiva non-icteric. ENT:  MMM,  no deformities , NO oral/nasal sores, Non-tender sinuses. Neck:  Neck supple, Non-tender, No  mass, thyromegaly,    Lymph:  No cervical  or  supraclavicular lymph node swelling. Pulmonary/Chest:  CTA bilat. , normal air movement, no respiratory distress  Cardiovascular:  Normal rate, + S1 and S2,  NO murmurs , rubs, gallups,     No edema  :  Deferred   Abd/GI: Deferred   Neurologic:  gait and coordination normal and speech normal  Skin:   No rashes or lesions, + cyanosis of feet noted during exam  Extremities:  No clubbing ,     Musculoskeletal:   Strength 5/5     Upper extremities:    SHOULDERS nontender ,   ELBOWS tender bilat, no swelling,   WRISTS nontender,   HANDS/FINGERS    MCPs tender bilat    PIPs tender bilat    DIPs nontender   Thickened fascia along bilateral palms    Lower extremities:  HIPS tender outer hips  KNEES tender bilat, no swelling  ANKLES nontender   FEET : tender bilat midfoot and MTPs, no swelling      Spine:   C-spine, T-spine & L-spine:  non tender,  ROM  normal ,   + archana, - Occiput to wall , SLR/Cross SLR.        LABS        CBC  Lab Results   Component Value Date    WBC 4.6 11/16/2020    RBC 4.31 11/16/2020    RBC 3.64 03/07/2012    HGB 13.9 11/16/2020    HCT 42.0 11/16/2020    MCV 97.4 11/16/2020    MCH 32.3 11/16/2020    MCHC 33.1 11/16/2020 RDW 12.4 11/04/2013     11/16/2020       CMP  Lab Results   Component Value Date    CALCIUM 9.9 11/16/2020    LABALBU 4.3 08/20/2019    PROT 7.6 08/20/2019     11/16/2020    K 4.0 11/16/2020    CO2 25 11/16/2020     11/16/2020    BUN 7 11/16/2020    CREATININE 0.7 11/16/2020    ALKPHOS 76 08/20/2019    ALT 11 08/20/2019    AST 16 08/20/2019       HgBA1c: No components found for: HGBA1C    Lab Results   Component Value Date    TSH 1.050 11/16/2020     No results found for: VITD25      Lab Results   Component Value Date    ANASCRN SEE BELOW 10/12/2011     No results found for: SSA  No results found for: SSB  No results found for: ANTI-SMITH  No results found for: DSDNAAB   No results found for: ANTIRNP  No results found for: C3, C4  Lab Results   Component Value Date    CCPAB SEE BELOW 10/12/2011     No results found for: RF    No components found for: CANCASCRN, APANCASCRN  Lab Results   Component Value Date    SEDRATE 6 10/12/2011     No results found for: CRP    RADIOLOGY:         ASSESSMENT/PLAN    Assessment   Plan     1. H/O rheumatoid arthritis  - Diagnosed with rheumatoid arthritis about 7 years ago by Dr. Elvin Rosales. Tx'd with tramadol q 8 hours. No other medications tried. Tender elbows, MCPs, PIPs, outer hips, knees, midfoot, and MTPs. + dupentrens contracture, color changes of feet noted on exam. No synovitis. + archana testing.    - checking RF, CCP for rheumatoid arthritis, SSA, SSB given sicca symptoms   - request records from Dr. Haim Jay   - CBC with Auto Differential; Future  - Comprehensive Metabolic Panel; Future  - C-Reactive Protein; Future  - Sedimentation Rate; Future  - Cyclic Citrul Peptide Antibody, IgG; Future  - Rheumatoid Factor; Future    2. Chronic bilateral low back pain without sciatica  - denies AM stiffness, + archana testing bilat. Constant pain  - XR SACROILIAC JOINTS (MIN 3 VIEWS); Future  - XR LUMBAR SPINE (2-3 VIEWS); Future    3.  Dry eyes  - Anti SSA; Future  - Anti SSB; Future    4. Dupuytren contracture  - bilateral hands      No follow-ups on file. Electronically signed by ALY Richardson CNP on 3/28/2022 at 5:01 PM    New Prescriptions    No medications on file         The risks and benefits of my recommendations, as well as other treatment options, benefits and side effects were discussed with the patient today. Questions were answered. Thank you for allowing me to participate in the care of this patient. Please call if there are any questions.

## 2022-03-30 LAB — CYCLIC CITRULLINATED PEPTIDE ANTIBODY IGG: 1.7 U/ML (ref 0–7)

## 2022-03-31 LAB
ANTI SSA: NORMAL
ANTI SSB: 3 AU/ML (ref 0–40)

## 2022-04-04 ENCOUNTER — TELEPHONE (OUTPATIENT)
Dept: RHEUMATOLOGY | Age: 57
End: 2022-04-04

## 2022-04-04 NOTE — TELEPHONE ENCOUNTER
I have left a voicemail on clinical line at office as well. Patient has completed labs as of 3/28/2022. She has not heard back from office regarding her prescription for Tramadol. She said she is now out of medication and would like a call back from the office regarding next steps.     # [phone]

## 2022-04-04 NOTE — TELEPHONE ENCOUNTER
Message was left asking for test result, I see pt was seen as New pt on 03/28/22 and results were mychart messaged to her 03/29/22 and 03/30/22 waiting on xray results. Called pt back asked her if she uses mychart she stated she did not, asked permission to deactivate she gave, reviewed results w/her and informed her we were waiting on xray results and asked if those were completed yet. Pt had not. Informed her to co,plete those asap and we will move on further w/treatment plan; pt seemed upset and stated she didn't understand.  I informed her since we have only seem her the one time it is normal protocol to order lots of labs and sometimes other testing to eval and decide on next step pt voiced understanding

## 2022-06-01 ENCOUNTER — TELEPHONE (OUTPATIENT)
Dept: RHEUMATOLOGY | Age: 57
End: 2022-06-01

## 2022-06-01 NOTE — TELEPHONE ENCOUNTER
Pt called stating that she has not heard any information on if she will be prescribed tramadol or what kind of treatment she will receive. I informed pt that since she has only been seen once our providers dont typically prescribe tramadol but I told her that I would send this to the providers to see what kind of treatment she will receive. Please advise.

## 2022-06-02 NOTE — TELEPHONE ENCOUNTER
Left message on voicemail informing pt that tramadol will not be prescribed and asked pt to return call to see if she has tried any NSAIDS.

## 2022-07-29 ENCOUNTER — TELEPHONE (OUTPATIENT)
Dept: RHEUMATOLOGY | Age: 57
End: 2022-07-29

## 2022-07-29 NOTE — TELEPHONE ENCOUNTER
Spoke to Pt on 7/29 to confirm appointment. She cancelled and stated she has to find another physician due to certain medication not able to be prescribed by this office. She no longer wants to be a patient.

## 2022-08-25 ENCOUNTER — HOSPITAL ENCOUNTER (EMERGENCY)
Age: 57
Discharge: HOME | End: 2022-08-25
Attending: NURSE PRACTITIONER
Payer: OTHER GOVERNMENT

## 2022-08-25 VITALS
TEMPERATURE: 97.8 F | DIASTOLIC BLOOD PRESSURE: 79 MMHG | BODY MASS INDEX: 21.69 KG/M2 | RESPIRATION RATE: 16 BRPM | SYSTOLIC BLOOD PRESSURE: 143 MMHG | HEART RATE: 72 BPM | HEIGHT: 66 IN | WEIGHT: 135 LBS | OXYGEN SATURATION: 98 %

## 2022-08-25 DIAGNOSIS — N30.00 ACUTE CYSTITIS WITHOUT HEMATURIA: Primary | ICD-10-CM

## 2022-08-25 LAB
BILIRUBIN URINE: NEGATIVE
BLOOD, URINE: NEGATIVE
CHARACTER, URINE: CLEAR
COLOR: YELLOW
GLUCOSE URINE: NEGATIVE MG/DL
KETONES, URINE: NEGATIVE
LEUKOCYTE ESTERASE, URINE: NEGATIVE
NITRITE, URINE: NEGATIVE
PH UA: 5.5 (ref 5–9)
PROTEIN UA: NEGATIVE MG/DL
SPECIFIC GRAVITY UA: >= 1.03 (ref 1–1.03)
UROBILINOGEN, URINE: 0.2 EU/DL (ref 0.2–1)

## 2022-08-25 PROCEDURE — 99213 OFFICE O/P EST LOW 20 MIN: CPT

## 2022-08-25 PROCEDURE — 87086 URINE CULTURE/COLONY COUNT: CPT

## 2022-08-25 PROCEDURE — 81003 URINALYSIS AUTO W/O SCOPE: CPT

## 2022-08-25 PROCEDURE — 99213 OFFICE O/P EST LOW 20 MIN: CPT | Performed by: NURSE PRACTITIONER

## 2022-08-25 RX ORDER — PHENAZOPYRIDINE HYDROCHLORIDE 200 MG/1
200 TABLET, FILM COATED ORAL 3 TIMES DAILY PRN
Qty: 4 TABLET | Refills: 0 | Status: SHIPPED | OUTPATIENT
Start: 2022-08-25 | End: 2022-08-27

## 2022-08-25 RX ORDER — SULFAMETHOXAZOLE AND TRIMETHOPRIM 800; 160 MG/1; MG/1
1 TABLET ORAL 2 TIMES DAILY
Qty: 10 TABLET | Refills: 0 | Status: SHIPPED | OUTPATIENT
Start: 2022-08-25 | End: 2022-08-30

## 2022-08-25 ASSESSMENT — ENCOUNTER SYMPTOMS: GASTROINTESTINAL NEGATIVE: 1

## 2022-08-25 NOTE — ED PROVIDER NOTES
[address]       Chief Complaint   Patient presents with    Urinary Tract Infection     Has taken left over cipro at home - 5 pills    Urinary Frequency    Dysuria       Nurses Notes reviewed and I agree except as noted in the HPI. HISTORY OF PRESENT ILLNESS   Elke Villarreal is a 65 y.o. female who presents     Dysuria   This is a new problem. The current episode started 2 days ago (developed symptoms and started Cipro from a previous UTI). The problem occurs every urination. The problem has not changed since onset. The quality of the pain is described as burning. There has been no fever. She is Sexually active. There is No history of pyelonephritis. Associated symptoms include frequency. Pertinent negatives include no hematuria and no flank pain. She has tried nothing for the symptoms. Her past medical history is significant for recurrent UTIs. REVIEW OF SYSTEMS     Review of Systems   Constitutional: Negative. HENT: Negative. Cardiovascular: Negative. Gastrointestinal: Negative. Genitourinary:  Positive for dysuria and frequency. Negative for decreased urine volume, flank pain and hematuria. PAST MEDICAL HISTORY         Diagnosis Date    Migraines     RA (rheumatoid arthritis) ([facility] [address]     Rheumatoid arthritis(714.0)        SURGICAL HISTORY     Patient  has a past surgical history that includes  section; Tubal ligation; Endometrial ablation; Endoscopy, colon, diagnostic (); transesophageal echocardiogram; and Hysterectomy. CURRENT MEDICATIONS       Discharge Medication List as of 2022  2:35 PM        CONTINUE these medications which have NOT CHANGED    Details   Metoprolol Succinate 50 MG CS24 Take by mouthHistorical Med      butalbital-acetaminophen-caffeine (FIORICET, ESGIC) per tablet Take 1 tablet by mouth every 4 hours as needed. ALLERGIES     Patient is has No Known Allergies.     FAMILY HISTORY     Patient'sfamily history includes Breast Cancer (age of onset: 62) in her sister; Breast Cancer (age of onset: 61) in her paternal aunt; Breast Cancer (age of onset: 79) in her paternal aunt; Breast Cancer (age of onset: [de-identified]) in her maternal grandmother; Cancer in her father and mother; Stroke in her mother. SOCIAL HISTORY     Patient  reports that she has quit smoking. Her smoking use included cigarettes. She has never used smokeless tobacco. She reports current alcohol use. She reports that she does not use drugs. PHYSICAL EXAM     ED TRIAGE VITALS  BP: (!) 143/79, Temp: 97.8 °F (36.6 °C), Heart Rate: 72, Resp: 16, SpO2: 98 %  Physical Exam  Constitutional:       General: She is not in acute distress. Appearance: Normal appearance. She is normal weight. She is not ill-appearing. HENT:      Head: Normocephalic and atraumatic. Cardiovascular:      Rate and Rhythm: Normal rate and regular rhythm. Pulses: Normal pulses. Heart sounds: Normal heart sounds. Pulmonary:      Effort: Pulmonary effort is normal.      Breath sounds: Normal breath sounds. Abdominal:      General: Abdomen is flat. Tenderness: There is no right CVA tenderness or left CVA tenderness. Neurological:      General: No focal deficit present. Mental Status: She is alert.        DIAGNOSTIC RESULTS   Labs:   Results for orders placed or performed during the hospital encounter of 08/25/22   Urinalysis   Result Value Ref Range    Glucose, Ur Negative NEGATIVE mg/dl    Bilirubin Urine Negative NEGATIVE    Ketones, Urine Negative NEGATIVE    Specific Gravity, UA >=1.030 1.002 - 1.030    Blood, Urine Negative NEGATIVE    pH, UA 5.50 5.0 - 9.0    Protein, UA Negative NEGATIVE mg/dl    Urobilinogen, Urine 0.20 0.2 - 1.0 eu/dl    Nitrite, Urine Negative NEGATIVE    Leukocyte Esterase, Urine Negative NEGATIVE    Color, UA Yellow STRAW-YELLOW    Character, Urine Clear CLEAR-SL CLOUD       IMAGING:  No orders to display     URGENT CARE COURSE:     Vitals:    08/25/22 1420   BP: (!) 143/79   Pulse: 72   Resp: 16   Temp: 97.8 °F (36.6 °C)   TempSrc: Temporal   SpO2: 98%   Weight: 135 lb (61.2 kg)   Height: 5' 6\" (1.676 m)       Medications - No data to display  PROCEDURES:  None  FINALIMPRESSION      1. Acute cystitis without hematuria        DISPOSITION/PLAN   DISPOSITION Decision To Discharge 08/25/2022 02:33:36 PM  Patient given educational materials - see patient instructions. Discussed use, benefit, and side effects of prescribed medications. All patient questions answered. Pt voiced understanding. Reviewed health maintenance. Patient agreed with treatment plan. Follow up as directed.        PATIENT REFERRED TO:  ALY Vogel CNP  [place] [address]  [phone]      If symptoms worsen  DISCHARGE MEDICATIONS:  Discharge Medication List as of 8/25/2022  2:35 PM        START taking these medications    Details   sulfamethoxazole-trimethoprim (BACTRIM DS) 800-160 MG per tablet Take 1 tablet by mouth 2 times daily for 5 days, Disp-10 tablet, R-0Normal      phenazopyridine (PYRIDIUM) 200 MG tablet Take 1 tablet by mouth 3 times daily as needed for Pain, Disp-4 tablet, R-0Normal           Discharge Medication List as of 8/25/2022  2:35 PM          Sheryle Ken, APRN - CNP Sheryle Ken, APRN - CNP  08/25/22 1150

## 2022-08-27 LAB — URINE CULTURE, ROUTINE: NORMAL

## 2022-09-29 ENCOUNTER — HOSPITAL ENCOUNTER (OUTPATIENT)
Dept: WOMENS IMAGING | Age: 57
Discharge: HOME | End: 2022-09-29
Payer: OTHER GOVERNMENT

## 2022-09-29 DIAGNOSIS — Z12.31 VISIT FOR SCREENING MAMMOGRAM: ICD-10-CM

## 2022-09-29 PROCEDURE — 77063 BREAST TOMOSYNTHESIS BI: CPT

## 2023-11-01 ENCOUNTER — HOSPITAL ENCOUNTER (OUTPATIENT)
Dept: WOMENS IMAGING | Age: 58
Discharge: HOME | End: 2023-11-01
Payer: OTHER GOVERNMENT

## 2023-11-01 VITALS — WEIGHT: 140 LBS | HEIGHT: 67 IN | BODY MASS INDEX: 21.97 KG/M2

## 2023-11-01 DIAGNOSIS — Z12.31 VISIT FOR SCREENING MAMMOGRAM: ICD-10-CM

## 2023-11-01 PROCEDURE — 77067 SCR MAMMO BI INCL CAD: CPT

## 2024-02-24 ENCOUNTER — HOSPITAL ENCOUNTER (EMERGENCY)
Age: 59
Discharge: HOME | End: 2024-02-24
Payer: OTHER GOVERNMENT

## 2024-02-24 VITALS
RESPIRATION RATE: 18 BRPM | HEART RATE: 82 BPM | OXYGEN SATURATION: 95 % | TEMPERATURE: 97.7 F | SYSTOLIC BLOOD PRESSURE: 106 MMHG | DIASTOLIC BLOOD PRESSURE: 74 MMHG

## 2024-02-24 DIAGNOSIS — B30.9 VIRAL CONJUNCTIVITIS: ICD-10-CM

## 2024-02-24 DIAGNOSIS — B96.89 ACUTE BACTERIAL SINUSITIS: Primary | ICD-10-CM

## 2024-02-24 DIAGNOSIS — J01.90 ACUTE BACTERIAL SINUSITIS: Primary | ICD-10-CM

## 2024-02-24 PROCEDURE — 99213 OFFICE O/P EST LOW 20 MIN: CPT | Performed by: NURSE PRACTITIONER

## 2024-02-24 PROCEDURE — 99213 OFFICE O/P EST LOW 20 MIN: CPT

## 2024-02-24 RX ORDER — DOXYCYCLINE HYCLATE 100 MG
100 TABLET ORAL 2 TIMES DAILY
Qty: 14 TABLET | Refills: 0 | Status: SHIPPED | OUTPATIENT
Start: 2024-02-24 | End: 2024-03-02

## 2024-02-24 RX ORDER — POLYMYXIN B SULFATE AND TRIMETHOPRIM 1; 10000 MG/ML; [USP'U]/ML
1 SOLUTION OPHTHALMIC EVERY 4 HOURS
Qty: 10 ML | Refills: 0 | Status: SHIPPED | OUTPATIENT
Start: 2024-02-24 | End: 2024-03-02

## 2024-02-24 ASSESSMENT — ENCOUNTER SYMPTOMS
EYE ITCHING: 0
VOMITING: 0
EYE REDNESS: 1
NAUSEA: 0
SORE THROAT: 0
COUGH: 1
SINUS PRESSURE: 1
ABDOMINAL PAIN: 0
SHORTNESS OF BREATH: 0
DIARRHEA: 0
SINUS PAIN: 1
EYE DISCHARGE: 0

## 2024-02-24 ASSESSMENT — PAIN SCALES - GENERAL: PAINLEVEL_OUTOF10: 1

## 2024-02-24 ASSESSMENT — PAIN - FUNCTIONAL ASSESSMENT: PAIN_FUNCTIONAL_ASSESSMENT: 0-10

## 2024-02-24 ASSESSMENT — PAIN DESCRIPTION - ORIENTATION: ORIENTATION: LEFT

## 2024-02-24 ASSESSMENT — PAIN DESCRIPTION - LOCATION: LOCATION: EYE

## 2024-02-24 NOTE — ED PROVIDER NOTES
[facility] URGENT CARE  UrgentCare Encounter      CHIEFCOMPLAINT       Chief Complaint   Patient presents with    Eye Problem    Sinus Problem       Nurses Notes reviewed and I agree except as noted in the HPI.  HISTORY OF PRESENT ILLNESS   Elke Villarreal is a 59 y.o. female who presents to the urgent care for evaluation.     She presents for evaluation of sinus congestion for the last week.  Woke up this morning with left eye redness.  She has been taking over-the-counter medications with no relief in symptoms.  She has even taken several doses of her head symptoms amoxicillin with no improvement. Symptoms are worse in the morning.     The patient/patient representative has no other acute complaints at this time.    REVIEW OF SYSTEMS     Review of Systems   Constitutional:  Negative for chills, fatigue and fever.   HENT:  Positive for congestion, sinus pressure and sinus pain. Negative for ear pain and sore throat.    Eyes:  Positive for redness. Negative for discharge and itching.   Respiratory:  Positive for cough. Negative for shortness of breath.    Cardiovascular:  Negative for chest pain.   Gastrointestinal:  Negative for abdominal pain, diarrhea, nausea and vomiting.   Skin:  Negative for rash.   Allergic/Immunologic: Negative for environmental allergies and food allergies.   Neurological:  Negative for headaches.   Hematological:  Negative for adenopathy.       PAST MEDICAL HISTORY         Diagnosis Date    Migraines     RA (rheumatoid arthritis) ([facility])     Rheumatoid arthritis(714.0)        SURGICAL HISTORY     Patient  has a past surgical history that includes  section; Tubal ligation; Endometrial ablation; Endoscopy, colon, diagnostic (); transesophageal echocardiogram; and Hysterectomy.    CURRENT MEDICATIONS       Previous Medications    BUTALBITAL-ACETAMINOPHEN-CAFFEINE (FIORICET, ESGIC) PER TABLET    Take 1 tablet by mouth every 4 hours as needed.      METOPROLOL SUCCINATE 50

## 2024-02-24 NOTE — ED TRIAGE NOTES
Has had a stuffy nose/congestion with drainage/cough for the last week and 1/2 woke this morning with redness of left eye

## 2024-04-17 ENCOUNTER — HOSPITAL ENCOUNTER (EMERGENCY)
Age: 59
Discharge: HOME | End: 2024-04-17
Payer: OTHER GOVERNMENT

## 2024-04-17 ENCOUNTER — APPOINTMENT (OUTPATIENT)
Dept: GENERAL RADIOLOGY | Age: 59
End: 2024-04-17
Payer: OTHER GOVERNMENT

## 2024-04-17 VITALS
WEIGHT: 135 LBS | HEART RATE: 66 BPM | BODY MASS INDEX: 21.69 KG/M2 | OXYGEN SATURATION: 98 % | DIASTOLIC BLOOD PRESSURE: 79 MMHG | RESPIRATION RATE: 16 BRPM | TEMPERATURE: 98.3 F | HEIGHT: 66 IN | SYSTOLIC BLOOD PRESSURE: 126 MMHG

## 2024-04-17 DIAGNOSIS — M94.0 ACUTE COSTOCHONDRITIS: Primary | ICD-10-CM

## 2024-04-17 PROCEDURE — 71101 X-RAY EXAM UNILAT RIBS/CHEST: CPT

## 2024-04-17 PROCEDURE — 99213 OFFICE O/P EST LOW 20 MIN: CPT | Performed by: NURSE PRACTITIONER

## 2024-04-17 PROCEDURE — 99213 OFFICE O/P EST LOW 20 MIN: CPT

## 2024-04-17 RX ORDER — KETOROLAC TROMETHAMINE 10 MG/1
10 TABLET, FILM COATED ORAL EVERY 8 HOURS PRN
Qty: 15 TABLET | Refills: 0 | Status: SHIPPED | OUTPATIENT
Start: 2024-04-17

## 2024-04-17 RX ORDER — CYCLOBENZAPRINE HCL 10 MG
10 TABLET ORAL 3 TIMES DAILY PRN
Qty: 30 TABLET | Refills: 0 | Status: SHIPPED | OUTPATIENT
Start: 2024-04-17 | End: 2024-04-27

## 2024-04-17 ASSESSMENT — ENCOUNTER SYMPTOMS
ABDOMINAL PAIN: 0
COUGH: 0
WHEEZING: 0
STRIDOR: 0
HEARTBURN: 0
TROUBLE SWALLOWING: 0
COLOR CHANGE: 0
ORTHOPNEA: 0
BACK PAIN: 0
SHORTNESS OF BREATH: 0
VOMITING: 0
CHEST TIGHTNESS: 0
NAUSEA: 0
CHOKING: 0
APNEA: 0

## 2024-04-17 ASSESSMENT — PAIN SCALES - GENERAL: PAINLEVEL_OUTOF10: 8

## 2024-04-17 ASSESSMENT — PAIN DESCRIPTION - ONSET: ONSET: GRADUAL

## 2024-04-17 ASSESSMENT — PAIN - FUNCTIONAL ASSESSMENT
PAIN_FUNCTIONAL_ASSESSMENT: PREVENTS OR INTERFERES SOME ACTIVE ACTIVITIES AND ADLS
PAIN_FUNCTIONAL_ASSESSMENT: 0-10

## 2024-04-17 ASSESSMENT — PAIN DESCRIPTION - ORIENTATION: ORIENTATION: RIGHT

## 2024-04-17 ASSESSMENT — PAIN DESCRIPTION - LOCATION: LOCATION: RIB CAGE

## 2024-04-17 ASSESSMENT — PAIN DESCRIPTION - FREQUENCY: FREQUENCY: CONTINUOUS

## 2024-04-17 ASSESSMENT — PAIN DESCRIPTION - PAIN TYPE: TYPE: ACUTE PAIN

## 2024-04-17 NOTE — ED TRIAGE NOTES
Pt to [facility] ambulatory with right rib area pain.  Pain started on Sunday when pt coughed.  No SOB present.

## 2024-04-17 NOTE — DISCHARGE INSTRUCTIONS
The patient will be instructed to continue taking anti-inflammatory medication, and given a Rx for short course of muscle relaxer's.   Rest,Ice 15-20 minutes TID x 2 days,Then Heat 15-20 minutes TID as needed The patient will be given back stretching exercises, and instructed to follow up with their PCP or community clinic for further evaluation.     The patient should return to the ED if the back pain worsens, or if they experience incontinence, numbness or tingling in the legs, or inability to ambulate.  The patient is in agreement with this plan.The patient tolerated their visit well.  The patient and / or the family were informed of the results of any tests, a time was given to answer questions, a plan was proposed and they agreed with plan. Follow up with PCP ×2-3 days for reevaluation and further management of care

## 2024-04-17 NOTE — ED PROVIDER NOTES
ill-appearing, toxic-appearing or diaphoretic.   HENT:      Head: Normocephalic and atraumatic.      Right Ear: External ear normal.      Left Ear: External ear normal.   Eyes:      Extraocular Movements: Extraocular movements intact.      Conjunctiva/sclera: Conjunctivae normal.   Pulmonary:      Effort: Pulmonary effort is normal.   Chest:       Musculoskeletal:         General: Normal range of motion.      Cervical back: Normal range of motion.   Skin:     General: Skin is warm.   Neurological:      General: No focal deficit present.      Mental Status: She is alert and oriented to person, place, and time.   Psychiatric:         Mood and Affect: Mood normal.         Behavior: Behavior normal.         Thought Content: Thought content normal.         Judgment: Judgment normal.         DIAGNOSTIC RESULTS   Labs:No results found for this visit on 04/17/24.    IMAGING:  XR RIBS RIGHT INCLUDE CHEST (MIN 3 VIEWS)   Final Result   1. No acute intrathoracic process.   2. No right rib fracture.      Final report electronically signed by Dr. Yanick Adler on 4/17/2024 2:09 PM        URGENT CARE COURSE:     Vitals:    04/17/24 1327   BP: 126/79   Pulse: 66   Resp: 16   Temp: 98.3 °F (36.8 °C)   TempSrc: Oral   SpO2: 98%   Weight: 61.2 kg (135 lb)   Height: 1.676 m (5' 6\")       Medications - No data to display  PROCEDURES:  None  FINAL IMPRESSION      1. Acute costochondritis        DISPOSITION/PLAN   Decision To Discharge       The patient will be instructed to continue taking anti-inflammatory medication and given a Rx for short course of muscle relaxer's.   Rest,Ice 15-20 minutes TID x 2 days,Then Heat 15-20 minutes TID as needed The patient will be given back stretching exercises, and instructed to follow up with their PCP or community clinic for further evaluation.     The patient should return to the ED if the back pain worsens, or if they experience incontinence, numbness or tingling in the legs, or inability to  ambulate.  The patient is in agreement with this plan.The patient tolerated their visit well.  The patient and / or the family were informed of the results of any tests, a time was given to answer questions, a plan was proposed and they agreed with plan. Follow up with PCP ×2-3 days for reevaluation and further management of care      REFERRED TO:  Breezy Avalos, ALY Thrasher CNP  [address] [facility] [address]  [place] [address]  [facility] [address]  [phone]    Schedule an appointment as soon as possible for a visit       [place], [place]  [address]  [facility] [address]  [facility] [address]  [phone]    Schedule an appointment as soon as possible for a visit       DISCHARGE MEDICATIONS:  Discharge Medication List as of 4/17/2024  2:16 PM        START taking these medications    Details   ketorolac (TORADOL) 10 MG tablet Take 1 tablet by mouth every 8 hours as needed for Pain, Disp-15 tablet, R-0Normal      cyclobenzaprine (FLEXERIL) 10 MG tablet Take 1 tablet by mouth 3 times daily as needed for Muscle spasms, Disp-30 tablet, R-0Normal           Discharge Medication List as of 4/17/2024  2:16 PM          ALY Valencia CNP, Tawnya Rae, APRN - CNP  04/17/24 2959